# Patient Record
Sex: FEMALE | Race: WHITE | Employment: OTHER | URBAN - METROPOLITAN AREA
[De-identification: names, ages, dates, MRNs, and addresses within clinical notes are randomized per-mention and may not be internally consistent; named-entity substitution may affect disease eponyms.]

---

## 2017-01-10 ENCOUNTER — TELEPHONE (OUTPATIENT)
Dept: INTERNAL MEDICINE CLINIC | Facility: CLINIC | Age: 62
End: 2017-01-10

## 2017-01-10 NOTE — TELEPHONE ENCOUNTER
Pt states symptoms have not resolved. Pt was seen at the clinic in [facility] for bronchitis and then again in the office on 12/16/2016. Pt was given a cough drop for symptoms both times.  The pt did call the office back on 12/19/2016 and was given Augmenti

## 2017-01-10 NOTE — TELEPHONE ENCOUNTER
Pt seen Dr. Claire Schaffer 12/16/16 for bronchitis, still not feeling well, finished almost the whole bottle of cough meds and has laryngitis now, would like to know what Amber Perea thinks she should do, call back

## 2017-01-11 ENCOUNTER — HOSPITAL ENCOUNTER (OUTPATIENT)
Dept: GENERAL RADIOLOGY | Age: 62
Discharge: HOME | End: 2017-01-11
Attending: PHYSICIAN ASSISTANT
Payer: COMMERCIAL

## 2017-01-11 ENCOUNTER — OFFICE VISIT (OUTPATIENT)
Dept: INTERNAL MEDICINE CLINIC | Facility: CLINIC | Age: 62
End: 2017-01-11

## 2017-01-11 VITALS
DIASTOLIC BLOOD PRESSURE: 80 MMHG | OXYGEN SATURATION: 97 % | HEIGHT: 68 IN | TEMPERATURE: 98 F | SYSTOLIC BLOOD PRESSURE: 114 MMHG | WEIGHT: 183 LBS | BODY MASS INDEX: 27.74 KG/M2 | HEART RATE: 76 BPM | RESPIRATION RATE: 16 BRPM

## 2017-01-11 DIAGNOSIS — J20.9 ACUTE BRONCHITIS, UNSPECIFIED ORGANISM: ICD-10-CM

## 2017-01-11 DIAGNOSIS — J20.9 ACUTE BRONCHITIS, UNSPECIFIED ORGANISM: Primary | ICD-10-CM

## 2017-01-11 PROCEDURE — 71020 XR CHEST PA + LAT CHEST (CPT=71020): CPT

## 2017-01-11 PROCEDURE — 99213 OFFICE O/P EST LOW 20 MIN: CPT | Performed by: PHYSICIAN ASSISTANT

## 2017-01-11 RX ORDER — HYDROCODONE POLISTIREX AND CHLORPHENIRAMINE POLISTIREX 10; 8 MG/5ML; MG/5ML
5 SUSPENSION, EXTENDED RELEASE ORAL 2 TIMES DAILY PRN
Qty: 200 ML | Refills: 0 | Status: SHIPPED | OUTPATIENT
Start: 2017-01-11 | End: 2017-01-31

## 2017-01-11 RX ORDER — PREDNISONE 20 MG/1
20 TABLET ORAL DAILY
Qty: 7 TABLET | Refills: 0 | Status: SHIPPED | OUTPATIENT
Start: 2017-01-11 | End: 2017-01-18

## 2017-01-12 ENCOUNTER — TELEPHONE (OUTPATIENT)
Dept: INTERNAL MEDICINE CLINIC | Facility: CLINIC | Age: 62
End: 2017-01-12

## 2017-01-12 DIAGNOSIS — J40 BRONCHITIS: Primary | ICD-10-CM

## 2017-01-12 RX ORDER — DOXYCYCLINE HYCLATE 100 MG
100 TABLET ORAL 2 TIMES DAILY
Qty: 20 TABLET | Refills: 0 | Status: SHIPPED | OUTPATIENT
Start: 2017-01-12 | End: 2017-04-04 | Stop reason: ALTCHOICE

## 2017-01-12 NOTE — TELEPHONE ENCOUNTER
----- Message from Everton Aguirre PA-C sent at 1/12/2017  8:22 AM CST -----  Please inform pt: possible infiltrate in the upper right lobe.  Treat with doxycycline 100mg bid x 10d, recheck xr 4 wks

## 2017-01-12 NOTE — TELEPHONE ENCOUNTER
Spoke with patient and relayed message. She verbalized understanding and agreed with plan. Doxy and CXR ordered. Patient instructed to call the office if symptoms worsen.

## 2017-01-16 NOTE — PROGRESS NOTES
HPI:   Nikole You is a 57year old female who presents for upper respiratory symptoms for  5  weeks. Patient reports cough, lingering, worse at night, with chest tightness. Patient denies nasal congestion, productive cough, fever, wheezing.   [place] myoma 2/2005     prominent myomas noted in the uterus   • Diverticulosis 6/2006     mild   • Internal hemorrhoids 6/2006     2+          Past Surgical History    TORY BIOPSY STEREOTACTIC NODULE 1 SITE RIGHT  1995    Comment benign    HYSTERECTOMY  2004    O no suspicious lesions  EYES: PERRLA, EOMI, normal optic disk, conjunctiva are clear  HEENT: atraumatic, normocephalic, TM's pearly gray with light reflex, oropharynx without erythema, exudates or tonsillar hypertrophy  NECK: supple, no adenopathy, FROM  TABBY

## 2017-02-10 ENCOUNTER — HOSPITAL ENCOUNTER (OUTPATIENT)
Dept: GENERAL RADIOLOGY | Age: 62
Discharge: HOME | End: 2017-02-10
Attending: PHYSICIAN ASSISTANT
Payer: COMMERCIAL

## 2017-02-10 DIAGNOSIS — J40 BRONCHITIS: ICD-10-CM

## 2017-02-10 PROCEDURE — 71020 XR CHEST PA + LAT CHEST (CPT=71020): CPT

## 2017-02-17 ENCOUNTER — MED REC SCAN ONLY (OUTPATIENT)
Dept: INTERNAL MEDICINE CLINIC | Facility: CLINIC | Age: 62
End: 2017-02-17

## 2017-02-23 ENCOUNTER — TELEPHONE (OUTPATIENT)
Dept: INTERNAL MEDICINE CLINIC | Facility: CLINIC | Age: 62
End: 2017-02-23

## 2017-02-23 NOTE — TELEPHONE ENCOUNTER
Pt states is still having the cough present since December. The pt has been trying to keep well hydrated during the day along with the cough drops. At night the cough is at its worse and takes the cough syrup at night.      Pt did report the chest x-ray was

## 2017-02-23 NOTE — TELEPHONE ENCOUNTER
Patient would like to get a script refilled for a cough she has at night  Hydrocod Polst CPM  10-8 mg/5ml  [address]  [place]

## 2017-02-24 ENCOUNTER — OFFICE VISIT (OUTPATIENT)
Dept: INTERNAL MEDICINE CLINIC | Facility: CLINIC | Age: 62
End: 2017-02-24

## 2017-02-24 VITALS
SYSTOLIC BLOOD PRESSURE: 120 MMHG | HEART RATE: 72 BPM | RESPIRATION RATE: 16 BRPM | OXYGEN SATURATION: 96 % | TEMPERATURE: 97 F | HEIGHT: 68 IN | WEIGHT: 183 LBS | DIASTOLIC BLOOD PRESSURE: 70 MMHG | BODY MASS INDEX: 27.74 KG/M2

## 2017-02-24 DIAGNOSIS — R05.3 CHRONIC COUGH: Primary | ICD-10-CM

## 2017-02-24 PROCEDURE — 94010 BREATHING CAPACITY TEST: CPT | Performed by: STUDENT IN AN ORGANIZED HEALTH CARE EDUCATION/TRAINING PROGRAM

## 2017-02-24 PROCEDURE — 99214 OFFICE O/P EST MOD 30 MIN: CPT | Performed by: STUDENT IN AN ORGANIZED HEALTH CARE EDUCATION/TRAINING PROGRAM

## 2017-02-24 RX ORDER — CETIRIZINE HYDROCHLORIDE 10 MG/1
10 TABLET ORAL DAILY
Qty: 30 TABLET | Refills: 3 | Status: SHIPPED | OUTPATIENT
Start: 2017-02-24 | End: 2017-11-29

## 2017-02-24 RX ORDER — HYDROCODONE POLISTIREX AND CHLORPHENIRAMINE POLISTIREX 10; 8 MG/5ML; MG/5ML
5 SUSPENSION, EXTENDED RELEASE ORAL 2 TIMES DAILY PRN
Qty: 200 ML | Refills: 0 | Status: SHIPPED | OUTPATIENT
Start: 2017-02-24 | End: 2018-01-11

## 2017-02-24 RX ORDER — FLUTICASONE PROPIONATE 50 MCG
1 SPRAY, SUSPENSION (ML) NASAL DAILY
Qty: 1 BOTTLE | Refills: 0 | Status: SHIPPED | OUTPATIENT
Start: 2017-02-24 | End: 2017-03-03

## 2017-02-24 NOTE — PROGRESS NOTES
[facility]    REASON FOR VISIT:    Current Complaints: Patient presents with:  Cough: FU, need for spirometry. HPI:  Vinny Matt is a 57year old female who presents for a follow-up visit for persistent cough.   Patient has had a cou MG/5ML Oral Liquid CR 2 tsp every 12 hours PRN Disp:  Rfl: 0   Multiple Vitamin (MULTI-VITAMIN DAILY) Oral Tab Take  by mouth. Disp:  Rfl:    Calcium Carbonate-Vitamin D 600-200 MG-UNIT Oral Tab Take  by mouth 2 (two) times daily.  Disp:  Rfl:    carbamazep all four quadrants. There is no tenderness. Musculoskeletal: Normal range of motion. No edema. Neurological: Alert and oriented to person, place, and time. Normal reflexes. Skin: Skin is warm. No rash noted. No erythema.    Psychiatric: Normal mood an

## 2017-02-27 NOTE — PATIENT INSTRUCTIONS
Cough, Chronic, Uncertain Cause (Adult)    Everyone has had a cough as part of the common cold, flu, or bronchitis. This kind of cough occurs along with an achy feeling, low-grade fever, nasal and sinus congestion, and a scratchy or sore throat.  This usu Let your healthcare provider know if you are taking any of these. Asthma  Cough may be the only sign of mild asthma. You may have tests to find out if asthma is causing your cough. You may also take asthma medicine on a trial basis.   Acid reflux (heartbur © 8742-8765 [address], [address] [place]. All rights reserved. This information is not intended as a substitute for professional medical care. Always follow your healthcare professional's instructions.

## 2017-03-03 ENCOUNTER — TELEPHONE (OUTPATIENT)
Dept: INTERNAL MEDICINE CLINIC | Facility: CLINIC | Age: 62
End: 2017-03-03

## 2017-03-03 DIAGNOSIS — R05.3 CHRONIC COUGH: Primary | ICD-10-CM

## 2017-03-03 NOTE — TELEPHONE ENCOUNTER
Patient called and said she was recommended Dr. Moshe Grant- pulmonologist, but he is not accepting new patients. Patient requesting another recommendation. Please advise. Patient mentioned Dr. Claire Schaffer had recommended Dr. Jose D Kenney.

## 2017-03-03 NOTE — TELEPHONE ENCOUNTER
Per Dr. Aline Pena refer to Dr. Pedro Jo or Dr. Radha Skinner. [address] [facility],[address]   Phone: [phone]  Pt given above referral information and expressed understanding. Referral placed.

## 2017-04-07 ENCOUNTER — OFFICE VISIT (OUTPATIENT)
Dept: INTERNAL MEDICINE CLINIC | Facility: CLINIC | Age: 62
End: 2017-04-07

## 2017-04-07 VITALS
TEMPERATURE: 98 F | BODY MASS INDEX: 27.58 KG/M2 | HEIGHT: 68 IN | SYSTOLIC BLOOD PRESSURE: 122 MMHG | RESPIRATION RATE: 16 BRPM | DIASTOLIC BLOOD PRESSURE: 72 MMHG | HEART RATE: 82 BPM | OXYGEN SATURATION: 98 % | WEIGHT: 182 LBS

## 2017-04-07 DIAGNOSIS — R05.3 CHRONIC COUGH: Primary | ICD-10-CM

## 2017-04-07 PROCEDURE — 99213 OFFICE O/P EST LOW 20 MIN: CPT | Performed by: STUDENT IN AN ORGANIZED HEALTH CARE EDUCATION/TRAINING PROGRAM

## 2017-04-08 NOTE — PROGRESS NOTES
[facility]    REASON FOR VISIT:    Current Complaints: Patient presents with:  Cough: 1 month Follow up      HPI:  Jayde Arnett is a 57year old female who presents for a f/u visit for chronic cough.   Patient had an episode of bronchitis mouth before breakfast. Disp: 90 tablet Rfl: 3   HYDROcodone Bitartrate Does not apply Crystals by Does not apply route. Disp:  Rfl:    Calcium Carbonate-Vitamin D 600-200 MG-UNIT Oral Tab Take  by mouth 2 (two) times daily.  Disp:  Rfl:    carbamazepine (T all four quadrants. There is no tenderness. Musculoskeletal: Normal range of motion. No edema. Neurological: Alert and oriented to person, place, and time. Normal reflexes. Skin: Skin is warm. No rash noted. No erythema.    Psychiatric: Normal mood an

## 2017-05-02 ENCOUNTER — OFFICE VISIT (OUTPATIENT)
Dept: INTERNAL MEDICINE CLINIC | Facility: CLINIC | Age: 62
End: 2017-05-02

## 2017-05-02 VITALS
HEART RATE: 103 BPM | RESPIRATION RATE: 16 BRPM | SYSTOLIC BLOOD PRESSURE: 130 MMHG | OXYGEN SATURATION: 97 % | TEMPERATURE: 98 F | HEIGHT: 68 IN | DIASTOLIC BLOOD PRESSURE: 70 MMHG

## 2017-05-02 DIAGNOSIS — J18.9 ATYPICAL PNEUMONIA: ICD-10-CM

## 2017-05-02 DIAGNOSIS — R05.3 CHRONIC COUGH: Primary | ICD-10-CM

## 2017-05-02 PROCEDURE — 99214 OFFICE O/P EST MOD 30 MIN: CPT | Performed by: INTERNAL MEDICINE

## 2017-05-02 RX ORDER — AMOXICILLIN AND CLAVULANATE POTASSIUM 875; 125 MG/1; MG/1
1 TABLET, FILM COATED ORAL 2 TIMES DAILY
Qty: 20 TABLET | Refills: 0 | Status: SHIPPED | OUTPATIENT
Start: 2017-05-02 | End: 2017-05-12

## 2017-05-02 RX ORDER — PREDNISONE 10 MG/1
TABLET ORAL
Qty: 30 TABLET | Refills: 0 | Status: SHIPPED | OUTPATIENT
Start: 2017-05-02 | End: 2017-05-16 | Stop reason: ALTCHOICE

## 2017-05-02 NOTE — PATIENT INSTRUCTIONS
Cough, Chronic, Uncertain Cause (Adult)    Everyone has had a cough as part of the common cold, flu, or bronchitis. This kind of cough occurs along with an achy feeling, low-grade fever, nasal and sinus congestion, and a scratchy or sore throat.  This usu Let your healthcare provider know if you are taking any of these. Asthma  Cough may be the only sign of mild asthma. You may have tests to find out if asthma is causing your cough. You may also take asthma medicine on a trial basis.   Acid reflux (heartbur © 4886-1910 [address], [address] [place]. All rights reserved. This information is not intended as a substitute for professional medical care. Always follow your healthcare professional's instructions.

## 2017-05-02 NOTE — PROGRESS NOTES
HPI:    Patient ID: Janes Patel is a 57year old female. Cough  This is a chronic problem. Episode onset: 7 m. The problem has been gradually worsening. The problem occurs every few minutes. The cough is productive of purulent sputum.  Pertinent Carbonate-Vitamin D 600-200 MG-UNIT Oral Tab Take  by mouth 2 (two) times daily. Disp:  Rfl:    carbamazepine (TEGRETOL) 200 MG Oral Tab Take 400 mg by mouth 2 (two) times daily.  Disp:  Rfl: 2   cetirizine 10 MG Oral Tab Take 1 tablet (10 mg total) by mout

## 2017-05-03 ENCOUNTER — TELEPHONE (OUTPATIENT)
Dept: INTERNAL MEDICINE CLINIC | Facility: CLINIC | Age: 62
End: 2017-05-03

## 2017-05-03 NOTE — TELEPHONE ENCOUNTER
Patient said she received a call today saying her CT was approved for insurance. When she called insurance they said they had no record of the approval.  Message was left on her home phone to call our office if she has any questions.

## 2017-05-03 NOTE — TELEPHONE ENCOUNTER
Called patient and left message that the CT scan was approved and to call central scheduling and make an appt.   The referral is only good until June 1,2017

## 2017-05-05 ENCOUNTER — HOSPITAL ENCOUNTER (OUTPATIENT)
Dept: CT IMAGING | Facility: HOSPITAL | Age: 62
Discharge: HOME | End: 2017-05-05
Attending: INTERNAL MEDICINE
Payer: COMMERCIAL

## 2017-05-05 DIAGNOSIS — R05.3 CHRONIC COUGH: ICD-10-CM

## 2017-05-05 DIAGNOSIS — J18.9 ATYPICAL PNEUMONIA: ICD-10-CM

## 2017-05-05 PROCEDURE — 71260 CT THORAX DX C+: CPT | Performed by: INTERNAL MEDICINE

## 2017-05-08 ENCOUNTER — LAB ENCOUNTER (OUTPATIENT)
Dept: LAB | Facility: HOSPITAL | Age: 62
End: 2017-05-08
Attending: Other
Payer: COMMERCIAL

## 2017-05-08 DIAGNOSIS — Z01.818 PRE-OP TESTING: Primary | ICD-10-CM

## 2017-05-08 PROCEDURE — 84520 ASSAY OF UREA NITROGEN: CPT

## 2017-05-08 PROCEDURE — 36415 COLL VENOUS BLD VENIPUNCTURE: CPT

## 2017-05-08 PROCEDURE — 82565 ASSAY OF CREATININE: CPT

## 2017-05-11 ENCOUNTER — HOSPITAL ENCOUNTER (OUTPATIENT)
Dept: MRI IMAGING | Facility: HOSPITAL | Age: 62
Discharge: HOME | End: 2017-05-11
Attending: Other
Payer: COMMERCIAL

## 2017-05-11 DIAGNOSIS — G40.919 BREAKTHROUGH SEIZURE (HCC): ICD-10-CM

## 2017-05-11 PROCEDURE — A9575 INJ GADOTERATE MEGLUMI 0.1ML: HCPCS

## 2017-05-11 PROCEDURE — 70553 MRI BRAIN STEM W/O & W/DYE: CPT | Performed by: OTHER

## 2017-05-16 ENCOUNTER — OFFICE VISIT (OUTPATIENT)
Dept: INTERNAL MEDICINE CLINIC | Facility: CLINIC | Age: 62
End: 2017-05-16

## 2017-05-16 VITALS
HEIGHT: 68 IN | TEMPERATURE: 99 F | WEIGHT: 182 LBS | BODY MASS INDEX: 27.58 KG/M2 | HEART RATE: 75 BPM | SYSTOLIC BLOOD PRESSURE: 118 MMHG | DIASTOLIC BLOOD PRESSURE: 70 MMHG | RESPIRATION RATE: 16 BRPM | OXYGEN SATURATION: 97 %

## 2017-05-16 DIAGNOSIS — Z13.220 LIPID SCREENING: ICD-10-CM

## 2017-05-16 DIAGNOSIS — Z13.0 SCREENING, ANEMIA, DEFICIENCY, IRON: ICD-10-CM

## 2017-05-16 DIAGNOSIS — Z00.00 LABORATORY EXAMINATION ORDERED AS PART OF A ROUTINE GENERAL MEDICAL EXAMINATION: ICD-10-CM

## 2017-05-16 DIAGNOSIS — Z13.29 THYROID DISORDER SCREEN: ICD-10-CM

## 2017-05-16 DIAGNOSIS — Z13.89 SCREENING FOR GENITOURINARY CONDITION: ICD-10-CM

## 2017-05-16 DIAGNOSIS — J98.01 POST-INFECTION BRONCHOSPASM: Primary | ICD-10-CM

## 2017-05-16 DIAGNOSIS — Z01.84 IMMUNITY STATUS TESTING: ICD-10-CM

## 2017-05-16 PROCEDURE — 99213 OFFICE O/P EST LOW 20 MIN: CPT | Performed by: INTERNAL MEDICINE

## 2017-05-16 NOTE — PROGRESS NOTES
HPI:    Patient ID: Jayde Arnett is a 57year old female. HPI  Follow up on cough. Ct reviewed. No infection or mass. Cough has resolved  Review of Systems   All other systems reviewed and are negative.            Current Outpatient Prescriptions REFLEX TO FREE T4  URINALYSIS, ROUTINE  HCV Antibody [E]    Meds This Visit:  No prescriptions requested or ordered in this encounter    Imaging & Referrals:  None       ZU#[id number]

## 2017-08-20 ENCOUNTER — APPOINTMENT (OUTPATIENT)
Dept: GENERAL RADIOLOGY | Facility: HOSPITAL | Age: 62
End: 2017-08-20
Attending: EMERGENCY MEDICINE
Payer: COMMERCIAL

## 2017-08-20 ENCOUNTER — HOSPITAL ENCOUNTER (EMERGENCY)
Facility: HOSPITAL | Age: 62
Discharge: HOME | End: 2017-08-20
Attending: EMERGENCY MEDICINE
Payer: COMMERCIAL

## 2017-08-20 VITALS
DIASTOLIC BLOOD PRESSURE: 87 MMHG | HEART RATE: 71 BPM | HEIGHT: 68 IN | SYSTOLIC BLOOD PRESSURE: 129 MMHG | TEMPERATURE: 98 F | BODY MASS INDEX: 27.28 KG/M2 | RESPIRATION RATE: 14 BRPM | OXYGEN SATURATION: 99 % | WEIGHT: 180 LBS

## 2017-08-20 DIAGNOSIS — S63.501A SPRAIN OF RIGHT WRIST, INITIAL ENCOUNTER: Primary | ICD-10-CM

## 2017-08-20 DIAGNOSIS — S63.502A SPRAIN OF LEFT WRIST, INITIAL ENCOUNTER: ICD-10-CM
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
## 2017-08-20 PROCEDURE — 73110 X-RAY EXAM OF WRIST: CPT | Performed by: EMERGENCY MEDICINE

## 2017-08-20 PROCEDURE — 99284 EMERGENCY DEPT VISIT MOD MDM: CPT

## 2017-08-20 PROCEDURE — 73130 X-RAY EXAM OF HAND: CPT | Performed by: EMERGENCY MEDICINE

## 2017-08-20 NOTE — ED PROVIDER NOTES
Patient Seen in: [facility] Emergency Department    History   Patient presents with:  Fall (musculoskeletal, neurologic)  Upper Extremity Injury (musculoskeletal)    Stated Complaint: fall, hand inj    HPI    Right handed 21-year-old presents after taking differently: Take 10 mg by mouth daily as needed. Ospemifene (OSPHENA) 60 MG Oral Tab,  Take 1 tablet by mouth daily. Calcium Carbonate-Vitamin D 600-200 MG-UNIT Oral Tab,  Take  by mouth 2 (two) times daily.    carbamazepine (TEGRETOL) 200 MG thenar eminences bilaterally left greater than right with some pain over the first metacarpal on the left. No tenderness of the forearms including the radial head bilaterally. Compartments are soft. Sensation intact. 2+ radial pulses bilaterally.   Cassie VIEWS), RIGHT (CPT=73110)  TECHNIQUE:  Three views were obtained. COMPARISON:  [facility] , XR WRIST COMPLETE (MIN 3 VIEWS), LEFT (CPT=73110), 8/20/2017, 12:54.   INDICATIONS:  fall, hand inj  PATIENT STATED HISTORY: (As transcribed by Technologist)  Pain to a

## 2017-08-20 NOTE — ED INITIAL ASSESSMENT (HPI)
Patient fell in parking lot, abrasions to knees, bridge of nose and bilateral wrists. Left wrist more swollen than right. Pain to bilateral wrists/base of thumbs. No n/t, good pulses and cap refill to both upper extremities.

## 2017-08-21 ENCOUNTER — TELEPHONE (OUTPATIENT)
Dept: INTERNAL MEDICINE CLINIC | Facility: CLINIC | Age: 62
End: 2017-08-21

## 2017-08-21 NOTE — TELEPHONE ENCOUNTER
I called patient and LM on  to call back and schedule an ER visit for fall-hand injury within 3 days. She was released from [facility] 08/20/2017.

## 2017-09-13 ENCOUNTER — OFFICE VISIT (OUTPATIENT)
Dept: INTERNAL MEDICINE CLINIC | Facility: CLINIC | Age: 62
End: 2017-09-13

## 2017-09-13 VITALS
OXYGEN SATURATION: 98 % | SYSTOLIC BLOOD PRESSURE: 130 MMHG | HEART RATE: 71 BPM | HEIGHT: 68 IN | WEIGHT: 189 LBS | DIASTOLIC BLOOD PRESSURE: 68 MMHG | BODY MASS INDEX: 28.64 KG/M2 | TEMPERATURE: 98 F | RESPIRATION RATE: 16 BRPM

## 2017-09-13 DIAGNOSIS — R42 DIZZINESS: Primary | ICD-10-CM

## 2017-09-13 DIAGNOSIS — J30.2 ACUTE SEASONAL ALLERGIC RHINITIS, UNSPECIFIED TRIGGER: ICD-10-CM

## 2017-09-13 PROCEDURE — 99213 OFFICE O/P EST LOW 20 MIN: CPT | Performed by: PHYSICIAN ASSISTANT

## 2017-09-13 NOTE — PATIENT INSTRUCTIONS
Start flonase nasal spray, 1 spray to each nostril daily. Also restart zyrtec once a day at bedtime.

## 2017-09-13 NOTE — PROGRESS NOTES
HPI:    Patient ID: Shivani Wooten is a 57year old female. Patient presents with:  Dizziness: and nausea that started this morning     Dizziness   This is a recurrent problem. The current episode started today. The problem occurs intermittently.  Halina Lang Multiple Vitamins-Minerals (WOMENS MULTIVITAMIN PLUS) Oral Tab Take by mouth. Disp:  Rfl:    cetirizine 10 MG Oral Tab Take 1 tablet (10 mg total) by mouth daily.  (Patient taking differently: Take 10 mg by mouth daily as needed.  ) Disp: 30 tablet Rfl: 3 She is alert and oriented to person, place, and time. She has normal strength and normal reflexes. No cranial nerve deficit or sensory deficit. Coordination and gait normal.   Skin: Skin is warm and dry. No rash noted. No erythema. No pallor.    Psychiatric

## 2017-09-25 ENCOUNTER — HOSPITAL ENCOUNTER (OUTPATIENT)
Dept: BONE DENSITY | Age: 62
Discharge: HOME | End: 2017-09-25
Attending: OBSTETRICS & GYNECOLOGY
Payer: COMMERCIAL

## 2017-09-25 ENCOUNTER — TELEPHONE (OUTPATIENT)
Dept: INTERNAL MEDICINE CLINIC | Facility: CLINIC | Age: 62
End: 2017-09-25

## 2017-09-25 DIAGNOSIS — Z13.220 SCREENING FOR LIPID DISORDERS: ICD-10-CM

## 2017-09-25 DIAGNOSIS — Z13.0 SCREENING, ANEMIA, DEFICIENCY, IRON: ICD-10-CM

## 2017-09-25 DIAGNOSIS — Z01.84 IMMUNITY STATUS TESTING: ICD-10-CM

## 2017-09-25 DIAGNOSIS — Z13.89 SCREENING FOR BLOOD OR PROTEIN IN URINE: ICD-10-CM

## 2017-09-25 DIAGNOSIS — Z01.419 ENCOUNTER FOR GYNECOLOGICAL EXAMINATION WITHOUT ABNORMAL FINDING: ICD-10-CM

## 2017-09-25 DIAGNOSIS — Z13.1 SCREENING FOR DIABETES MELLITUS (DM): ICD-10-CM

## 2017-09-25 DIAGNOSIS — N94.10 DYSPAREUNIA IN FEMALE: ICD-10-CM

## 2017-09-25 DIAGNOSIS — Z13.228 SCREENING FOR METABOLIC DISORDER: ICD-10-CM

## 2017-09-25 DIAGNOSIS — N94.10 DYSPAREUNIA, FEMALE: ICD-10-CM

## 2017-09-25 DIAGNOSIS — Z78.0 MENOPAUSE: ICD-10-CM

## 2017-09-25 PROCEDURE — 77080 DXA BONE DENSITY AXIAL: CPT | Performed by: OBSTETRICS & GYNECOLOGY

## 2017-11-27 ENCOUNTER — HOSPITAL ENCOUNTER (OUTPATIENT)
Dept: MAMMOGRAPHY | Age: 62
Discharge: HOME | End: 2017-11-27
Attending: OBSTETRICS & GYNECOLOGY
Payer: COMMERCIAL

## 2017-11-27 DIAGNOSIS — Z12.31 ENCOUNTER FOR SCREENING MAMMOGRAM FOR BREAST CANCER: ICD-10-CM

## 2017-11-27 PROCEDURE — 77067 SCR MAMMO BI INCL CAD: CPT | Performed by: OBSTETRICS & GYNECOLOGY

## 2017-11-27 PROCEDURE — 77063 BREAST TOMOSYNTHESIS BI: CPT | Performed by: OBSTETRICS & GYNECOLOGY

## 2017-11-30 ENCOUNTER — HOSPITAL ENCOUNTER (OUTPATIENT)
Dept: MAMMOGRAPHY | Facility: HOSPITAL | Age: 62
Discharge: HOME | End: 2017-11-30
Attending: OBSTETRICS & GYNECOLOGY
Payer: COMMERCIAL

## 2017-11-30 DIAGNOSIS — R92.2 INCONCLUSIVE MAMMOGRAM: ICD-10-CM

## 2017-11-30 PROCEDURE — 77061 BREAST TOMOSYNTHESIS UNI: CPT | Performed by: OBSTETRICS & GYNECOLOGY

## 2017-11-30 PROCEDURE — 77065 DX MAMMO INCL CAD UNI: CPT | Performed by: OBSTETRICS & GYNECOLOGY

## 2017-11-30 PROCEDURE — 76642 ULTRASOUND BREAST LIMITED: CPT | Performed by: OBSTETRICS & GYNECOLOGY

## 2017-12-29 ENCOUNTER — OFFICE VISIT (OUTPATIENT)
Dept: FAMILY MEDICINE CLINIC | Facility: CLINIC | Age: 62
End: 2017-12-29

## 2017-12-29 VITALS
HEART RATE: 77 BPM | DIASTOLIC BLOOD PRESSURE: 52 MMHG | TEMPERATURE: 99 F | OXYGEN SATURATION: 98 % | RESPIRATION RATE: 20 BRPM | WEIGHT: 180 LBS | SYSTOLIC BLOOD PRESSURE: 122 MMHG | BODY MASS INDEX: 27.28 KG/M2 | HEIGHT: 68 IN

## 2017-12-29 DIAGNOSIS — R05.9 COUGH: Primary | ICD-10-CM

## 2017-12-29 PROCEDURE — 99213 OFFICE O/P EST LOW 20 MIN: CPT | Performed by: NURSE PRACTITIONER

## 2017-12-29 NOTE — PROGRESS NOTES
CHIEF COMPLAINT:   Patient presents with:  Cough: cough [id number]      HPI:   Vinny Matt is a 60year old female who presents for upper respiratory symptoms for  10 days. Patient reports cough, productive moderate amt clear phlegm.   Pt states she h 6/21/2005: ELECTROCARDIOGRAM, COMPLETE  2004: HYSTERECTOMY  1995: TORY BIOPSY STEREOTACTIC NODULE 1 SITE RIGHT      Comment: benign  2004: OOPHORECTOMY  2010: OTHER SURGICAL HISTORY      Comment: achilles heel  1/30/2006: OTHER SURGICAL HISTORY      Comment Pt had similar cough at this time last year, multiple rounds of abx and oral steroid did not help per pt report  I reviewed notes in EMR related to pt's previous cough  Pt defers abx or oral steroid today  I advised nasal spray, asmanex inhaler 1 puff helio · You may use over-the-counter medicine to control fever or pain, unless another pain medicine was prescribed. If you have chronic liver or kidney disease or have ever had a stomach ulcer or gastrointestinal bleeding, talk with your healthcare provider bef © 4901-9946 [address]. [address] [facility], [facility] [place]. All rights reserved. This information is not intended as a substitute for professional medical care. Always follow your healthcare professional's instructions.             The

## 2017-12-29 NOTE — PATIENT INSTRUCTIONS
Viral Bronchitis (Adult)    You have a viral bronchitis. Bronchitis is inflammation and swelling of the lining of the lungs. This is often caused by an infection. Symptoms include a dry, hacking cough that is worse at night.  The cough may bring up yellow · Over-the-counter cough, cold, and sore-throat medicines will not shorten the length of the illness, but they may help to reduce symptoms. Don't use decongestants if you have high blood pressure.   Follow-up care  Follow up with your healthcare provider, o

## 2018-01-05 ENCOUNTER — TELEPHONE (OUTPATIENT)
Dept: INTERNAL MEDICINE CLINIC | Facility: CLINIC | Age: 63
End: 2018-01-05

## 2018-01-05 NOTE — TELEPHONE ENCOUNTER
Patient was seen at [facility] and treated for viral bronchitis. She has been taking Asmanex and Flonase as she deferred antibiotics and steroids. Patient states she feels a lot better and cough has started to subside.  States she has been taking Delsym at night

## 2018-01-11 ENCOUNTER — OFFICE VISIT (OUTPATIENT)
Dept: INTERNAL MEDICINE CLINIC | Facility: CLINIC | Age: 63
End: 2018-01-11

## 2018-01-11 VITALS
OXYGEN SATURATION: 98 % | SYSTOLIC BLOOD PRESSURE: 118 MMHG | HEIGHT: 68 IN | WEIGHT: 180 LBS | HEART RATE: 78 BPM | BODY MASS INDEX: 27.28 KG/M2 | RESPIRATION RATE: 16 BRPM | TEMPERATURE: 98 F | DIASTOLIC BLOOD PRESSURE: 72 MMHG

## 2018-01-11 DIAGNOSIS — J20.9 ACUTE BRONCHITIS, UNSPECIFIED ORGANISM: Primary | ICD-10-CM

## 2018-01-11 PROCEDURE — 99213 OFFICE O/P EST LOW 20 MIN: CPT | Performed by: STUDENT IN AN ORGANIZED HEALTH CARE EDUCATION/TRAINING PROGRAM

## 2018-01-11 RX ORDER — FLUTICASONE PROPIONATE 50 MCG
1 SPRAY, SUSPENSION (ML) NASAL DAILY
Qty: 1 BOTTLE | Refills: 0 | Status: SHIPPED | OUTPATIENT
Start: 2018-01-11 | End: 2018-01-18

## 2018-01-11 RX ORDER — HYDROCODONE POLISTIREX AND CHLORPHENIRAMINE POLISTIREX 10; 8 MG/5ML; MG/5ML
5 SUSPENSION, EXTENDED RELEASE ORAL 2 TIMES DAILY PRN
Qty: 200 ML | Refills: 0 | Status: SHIPPED | OUTPATIENT
Start: 2018-01-11 | End: 2018-02-01

## 2018-01-11 RX ORDER — INFLUENZA VIRUS VACCINE 15; 15; 15; 15 UG/.5ML; UG/.5ML; UG/.5ML; UG/.5ML
SUSPENSION INTRAMUSCULAR
COMMUNITY
Start: 2017-10-17 | End: 2018-04-26 | Stop reason: ALTCHOICE

## 2018-01-11 RX ORDER — AZITHROMYCIN 250 MG/1
TABLET, FILM COATED ORAL
Qty: 6 TABLET | Refills: 0 | Status: SHIPPED | OUTPATIENT
Start: 2018-01-11 | End: 2018-02-01 | Stop reason: ALTCHOICE

## 2018-01-11 NOTE — PROGRESS NOTES
HPI:    Patient ID: Binu Spears is a 60year old female. Here today for evaluation of persistent cough. Patient was seen in walk-in clinic on 12/29 with upper respiratory symptoms predominantly coughing for 10 days.   At that time cough was produ pain and palpitations. Gastrointestinal: Negative. Negative for abdominal pain. Endocrine: Negative. Genitourinary: Negative. Musculoskeletal: Negative. Negative for arthralgias and myalgias. Skin: Negative. Neurological: Negative.     Psyc Pulmonary/Chest: Effort normal. No respiratory distress. She has no wheezes. She has rhonchi in the right middle field, the right lower field, the left middle field and the left lower field. She has no rales. Abdominal: Soft.  Bowel sounds are normal. 1 spray by Each Nare route daily.            Imaging & Referrals:  None       Seble Mejia MD

## 2018-02-01 ENCOUNTER — OFFICE VISIT (OUTPATIENT)
Dept: INTERNAL MEDICINE CLINIC | Facility: CLINIC | Age: 63
End: 2018-02-01

## 2018-02-01 VITALS
WEIGHT: 180 LBS | RESPIRATION RATE: 16 BRPM | OXYGEN SATURATION: 99 % | BODY MASS INDEX: 27 KG/M2 | DIASTOLIC BLOOD PRESSURE: 76 MMHG | SYSTOLIC BLOOD PRESSURE: 122 MMHG | TEMPERATURE: 99 F | HEART RATE: 103 BPM

## 2018-02-01 DIAGNOSIS — R05.9 COUGH: ICD-10-CM

## 2018-02-01 DIAGNOSIS — B34.9 ACUTE VIRAL SYNDROME: Primary | ICD-10-CM

## 2018-02-01 PROCEDURE — 87502 INFLUENZA DNA AMP PROBE: CPT | Performed by: STUDENT IN AN ORGANIZED HEALTH CARE EDUCATION/TRAINING PROGRAM

## 2018-02-01 PROCEDURE — 87798 DETECT AGENT NOS DNA AMP: CPT | Performed by: STUDENT IN AN ORGANIZED HEALTH CARE EDUCATION/TRAINING PROGRAM

## 2018-02-01 PROCEDURE — 99213 OFFICE O/P EST LOW 20 MIN: CPT | Performed by: STUDENT IN AN ORGANIZED HEALTH CARE EDUCATION/TRAINING PROGRAM

## 2018-02-01 RX ORDER — OSELTAMIVIR PHOSPHATE 75 MG/1
75 CAPSULE ORAL 2 TIMES DAILY
Qty: 10 CAPSULE | Refills: 0 | Status: SHIPPED | OUTPATIENT
Start: 2018-02-01 | End: 2018-02-06

## 2018-02-01 RX ORDER — HYDROCODONE POLISTIREX AND CHLORPHENIRAMINE POLISTIREX 10; 8 MG/5ML; MG/5ML
5 SUSPENSION, EXTENDED RELEASE ORAL 2 TIMES DAILY PRN
Qty: 200 ML | Refills: 0 | Status: SHIPPED | OUTPATIENT
Start: 2018-02-01 | End: 2018-03-03

## 2018-02-01 NOTE — PROGRESS NOTES
HPI:    Patient ID: Abiodun James is a 60year old female. Cough   This is a new problem. The current episode started yesterday. The problem has been gradually worsening. The problem occurs hourly. The cough is non-productive.  Associated symptoms Prefilled Syringe  Disp:  Rfl:    Ospemifene (OSPHENA) 60 MG Oral Tab Take 1 tablet by mouth daily.  Disp: 90 tablet Rfl: 3   LEVOXYL 150 MCG Oral Tab Take 1 tablet (150 mcg total) by mouth before breakfast. Disp: 90 tablet Rfl: 3   Multiple Vitamins-[place] Acute viral syndrome  (primary encounter diagnosis)  Cough    Suspect influenza. Will do the nasal swab antigen test. Start Tamiflu 75 mg bid, if FLU PCR is positive - patient is to continue Tamiflu for five days.   Patient instructed to use Tylenol/Advil

## 2018-02-02 ENCOUNTER — TELEPHONE (OUTPATIENT)
Dept: INTERNAL MEDICINE CLINIC | Facility: CLINIC | Age: 63
End: 2018-02-02

## 2018-02-02 NOTE — TELEPHONE ENCOUNTER
Spoke with Armond Perez from [facility] lab she reports: Positive for Influenza A Nucleic Acid      Per Dr. Tania Diaz finish tamiflu. D/w spouse results and above recommendations. He expressed understanding.

## 2018-04-26 ENCOUNTER — OFFICE VISIT (OUTPATIENT)
Dept: INTERNAL MEDICINE CLINIC | Facility: CLINIC | Age: 63
End: 2018-04-26

## 2018-04-26 VITALS
SYSTOLIC BLOOD PRESSURE: 124 MMHG | TEMPERATURE: 98 F | RESPIRATION RATE: 16 BRPM | BODY MASS INDEX: 28.19 KG/M2 | HEART RATE: 85 BPM | WEIGHT: 186 LBS | DIASTOLIC BLOOD PRESSURE: 80 MMHG | HEIGHT: 68 IN | OXYGEN SATURATION: 98 %

## 2018-04-26 DIAGNOSIS — M65.342 TRIGGER RING FINGER OF LEFT HAND: Primary | ICD-10-CM

## 2018-04-26 DIAGNOSIS — F50.81 BINGE EATING DISORDER: ICD-10-CM

## 2018-04-26 PROCEDURE — 99214 OFFICE O/P EST MOD 30 MIN: CPT | Performed by: STUDENT IN AN ORGANIZED HEALTH CARE EDUCATION/TRAINING PROGRAM

## 2018-04-26 NOTE — PROGRESS NOTES
HPI:    Patient ID: Gabriel Mlilan is a 60year old female. HPI  Patient is here today complaining of left ring finger locking that she noticed about 6 weeks ago.   She wakes up in the morning and his finger is flexed and it takes her effort to ext Allergies:  Erythromycin [Emcin*    Confusion  Tomatoes                   PHYSICAL EXAM:   Physical Exam   Constitutional: She is oriented to person, place, and time. She appears well-developed and well-nourished.    HENT:   Head: Normocephalic and atraum

## 2018-04-27 ENCOUNTER — TELEPHONE (OUTPATIENT)
Dept: INTERNAL MEDICINE CLINIC | Facility: CLINIC | Age: 63
End: 2018-04-27

## 2018-05-25 ENCOUNTER — TELEPHONE (OUTPATIENT)
Dept: INTERNAL MEDICINE CLINIC | Facility: CLINIC | Age: 63
End: 2018-05-25

## 2018-05-25 NOTE — TELEPHONE ENCOUNTER
Received fax from [facility] on file - prior auth needed for vyvanse 60mg. Form in triage.

## 2018-05-25 NOTE — PROGRESS NOTES
[facility]    REASON FOR VISIT:    Current Complaints: Patient presents with:  Weight Loss: 1 m f/u      HPI:  Lisbeth Weaver is a 60year old female who presents for 1 month f/u for binge eating disorder.     Patient has been taking Vyvanse HENT: Negative for hearing loss, congestion, sore throat and neck pain. Eyes: Negative for pain and visual disturbance. Respiratory: Negative for cough and shortness of breath. Cardiovascular: Negative for chest pain and palpitations.    The [employer] disorder   Started on Vyvanse 50 mg 4 weeks ago - reports great improvement in her symptoms. Will increase to 60 mg daily and continue at this dose long term. F/u in 3 months. -     Lisdexamfetamine Dimesylate (VYVANSE) 60 MG Oral Cap;  Take 1 capsule

## 2018-06-22 ENCOUNTER — TELEPHONE (OUTPATIENT)
Dept: INTERNAL MEDICINE CLINIC | Facility: CLINIC | Age: 63
End: 2018-06-22

## 2018-06-22 NOTE — TELEPHONE ENCOUNTER
Approval for early refill of 2 days was approved. The pt will be leaving out of town. The pharmacist verbalized understanding.

## 2018-06-22 NOTE — TELEPHONE ENCOUNTER
[facility] calling to verify if it is ok to fill vyvanse script today - script was written with a fill date of 6/24 but the pt says she is going out of town today. Please c/b at #[phone].

## 2018-08-24 PROBLEM — F50.81 BINGE EATING DISORDER: Status: ACTIVE | Noted: 2018-08-24

## 2018-08-24 PROBLEM — F50.819 BINGE EATING DISORDER: Status: ACTIVE | Noted: 2018-08-24

## 2018-08-24 NOTE — PROGRESS NOTES
[facility]    REASON FOR VISIT:    Current Complaints: Patient presents with:  Eating Disorder      HPI:  Pj Morales is a 60year old female who presents for 1 month f/u for binge eating disorder.     Patient has been taking Vyvanse 60 and shortness of breath. Cardiovascular: Negative for chest pain and palpitations. Gastrointestinal: Negative for nausea, vomiting, abdominal pain and diarrhea. Genitourinary: Negative for urgency, frequency and difficulty urinating.    Musculoskelet Well controlled with current therapy. Continue Vyvanse at 60 mg daily. F/u in 3 months. -     Lisdexamfetamine Dimesylate (VYVANSE) 60 MG Oral Cap;  Take 1 capsule (60 mg total) by mouth daily.  -     Lisdexamfetamine Dimesylate (VYVANSE) 60 MG Oral Cap

## 2018-11-08 ENCOUNTER — TELEPHONE (OUTPATIENT)
Dept: INTERNAL MEDICINE CLINIC | Facility: CLINIC | Age: 63
End: 2018-11-08

## 2018-11-08 ENCOUNTER — MED REC SCAN ONLY (OUTPATIENT)
Dept: INTERNAL MEDICINE CLINIC | Facility: CLINIC | Age: 63
End: 2018-11-08

## 2018-11-08 NOTE — TELEPHONE ENCOUNTER
Pt returned call to r/s appt with Dr Jeison Rees 11-16-18. No appts avail prior to her vivance running out on 11-24. Further, pt is only following Dr Jeison Rees for this issue, will return to Dr Joaquín Pro when this issue is resolved. Please advise.  Thank you

## 2018-11-09 NOTE — TELEPHONE ENCOUNTER
Noted below. Already spoke to patient and informed her of her appointment next week with Dr. Delphine Estrella on 11/19/18.

## 2018-11-09 NOTE — TELEPHONE ENCOUNTER
FYI - Patient called and wanted to let us know that she plans to finish this medication with Dr. Dennise Starkey and then continue to follow with Dr. Car Kinsey

## 2018-11-09 NOTE — TELEPHONE ENCOUNTER
Patient thought that her appointment on 11/16/18 needed to be rescheduled, and that is why she is asking for a script for her Vyvanse, but it was not cancelled. Dr. Dennise Starkey will be here.  Informed the patient of this, and she will be here for that appointm

## 2018-11-19 ENCOUNTER — OFFICE VISIT (OUTPATIENT)
Dept: INTERNAL MEDICINE CLINIC | Facility: CLINIC | Age: 63
End: 2018-11-19
Payer: COMMERCIAL

## 2018-11-19 VITALS
HEIGHT: 68 IN | SYSTOLIC BLOOD PRESSURE: 120 MMHG | WEIGHT: 172.81 LBS | DIASTOLIC BLOOD PRESSURE: 82 MMHG | HEART RATE: 67 BPM | OXYGEN SATURATION: 98 % | BODY MASS INDEX: 26.19 KG/M2 | TEMPERATURE: 98 F | RESPIRATION RATE: 14 BRPM

## 2018-11-19 DIAGNOSIS — F50.81 BINGE EATING DISORDER: ICD-10-CM

## 2018-11-19 PROCEDURE — 99213 OFFICE O/P EST LOW 20 MIN: CPT | Performed by: STUDENT IN AN ORGANIZED HEALTH CARE EDUCATION/TRAINING PROGRAM

## 2018-11-19 NOTE — PROGRESS NOTES
[facility]    REASON FOR VISIT:    Current Complaints: Patient presents with:   Follow - Up: BED, her thyroid medication got changed so she is curious if thats why she isnt losing the weight      HPI:  Myrna Gonzales is a 58year old female Reflexes are normal.   HENT: Negative for hearing loss, congestion, sore throat and neck pain. Eyes: Negative for pain and visual disturbance. Respiratory: Negative for cough and shortness of breath.     Cardiovascular: Negative for chest pain and palp thyroid medication got changed so she is curious if thats why she isnt losing the weight         Quan Lakeshamaurizio was seen today for follow - up. Diagnoses and all orders for this visit:    Binge eating disorder  Controlled on current therapy.  Will get her new

## 2018-11-28 ENCOUNTER — HOSPITAL ENCOUNTER (OUTPATIENT)
Dept: MAMMOGRAPHY | Age: 63
Discharge: HOME | End: 2018-11-28
Attending: OBSTETRICS & GYNECOLOGY
Payer: COMMERCIAL

## 2018-11-28 DIAGNOSIS — Z01.419 ENCOUNTER FOR GYNECOLOGICAL EXAMINATION WITHOUT ABNORMAL FINDING: ICD-10-CM

## 2018-11-28 DIAGNOSIS — Z78.0 MENOPAUSE: ICD-10-CM

## 2018-11-28 PROCEDURE — 77063 BREAST TOMOSYNTHESIS BI: CPT | Performed by: OBSTETRICS & GYNECOLOGY

## 2018-11-28 PROCEDURE — 77067 SCR MAMMO BI INCL CAD: CPT | Performed by: OBSTETRICS & GYNECOLOGY

## 2018-11-30 ENCOUNTER — TELEPHONE (OUTPATIENT)
Dept: INTERNAL MEDICINE CLINIC | Facility: CLINIC | Age: 63
End: 2018-11-30

## 2018-11-30 NOTE — TELEPHONE ENCOUNTER
PT dropped off [facility]  She would like Dr to call and discuss the medication dosage, should she take 60MG or 70MG

## 2018-12-03 NOTE — TELEPHONE ENCOUNTER
Received TSH+T4 labs dated 11/19/18. Current dose levothyroxine 137mcg. Please advise on dosing. Entered results in external result console as able.      Spoke with pt, she forwarded copy of lab because of discussion from last OV with Dr Efe Maier wondering

## 2018-12-05 NOTE — TELEPHONE ENCOUNTER
As per Dr. Andreina Fernandez note - continue present management with levothyroxine dose of 137 mcg daily. Discussed Vyvanse dosage. She feels she becomes intolerant to 60 mg.  Okay to try 70 mg of Vyvanse for the next 2 months.   Patient will call the office with a

## 2018-12-17 NOTE — TELEPHONE ENCOUNTER
Patient discussed changing her dosage of Lisdexamfetamine Dimesylate (VYVANSE) 60 MG Oral Cap  from 60 MG to 70 MG with Dr Remi Molina. Dr Remi Molina asked her to call to request the new prescriptions.   Patient will need paper scripts for a 30 day supply of th

## 2018-12-18 NOTE — TELEPHONE ENCOUNTER
The patient called and left a message requesting an increase in her Vyvanse from 60 mg daily to 70 mg daily, and requested two printed scripts: one for January and one for February. Is this okay? Pended medication order and routed to Dr. Carla Reddy.      Note

## 2018-12-20 NOTE — TELEPHONE ENCOUNTER
The patient called again today f/u on prescription requesting an increase in her Vyvanse from 60 mg daily to 70 mg daily, and requested two printed scripts: one for January and one for February. Is this okay?  Pended medication order and routed to Dr. Rebel Austin

## 2018-12-20 NOTE — TELEPHONE ENCOUNTER
Patient following up regarding increase in her medication. Patient stated she will be in area around 1 p.m today if possible to be done by then. Please advise. Thank you!

## 2018-12-27 NOTE — TELEPHONE ENCOUNTER
Noted below. Prior authorization has been submitted for the patient's Vyvanse through covermymeds. Received the below message: Your demographic data has been sent to [employer] successfully.  They will respond shortly with your clinical questions and you will b

## 2018-12-27 NOTE — TELEPHONE ENCOUNTER
Incoming (mail or fax):   Fax  Received from:  [facility]  Documentation given to:  Left documentation in triage incoming bin    **Prior [facility]

## 2018-12-28 NOTE — TELEPHONE ENCOUNTER
Approval received for Vyvanse 70mg,spoke to pharmacy they are aware. LM for patient to let her know she does not need to  the 60mg scripted, cancelled 60mg script. PA approval in folder in triage office.

## 2018-12-28 NOTE — TELEPHONE ENCOUNTER
Patient is wondering if she can have a short term dose of the Vyvanse 60mg so she is not out of the medication until she gets the 70mg approved. Order pended. Patient okay to  today at 2:30pm. Routed to Dr. Hafsa Salamanca

## 2018-12-28 NOTE — TELEPHONE ENCOUNTER
(Key: [id number])- Reviewed Covermymeds Prior Authorization, no approval yet.  LM for patient to let her know we are still waiting for medication approval.

## 2018-12-28 NOTE — TELEPHONE ENCOUNTER
FYI - Patient called in check on the status of this medication as she is out. Please advise patient once authorization has been received. Thanks!

## 2019-01-04 ENCOUNTER — TELEPHONE (OUTPATIENT)
Dept: INTERNAL MEDICINE CLINIC | Facility: CLINIC | Age: 64
End: 2019-01-04

## 2019-01-04 NOTE — TELEPHONE ENCOUNTER
This is a Printed Script, Order pended. Please sign and print and I will put in an envelope for patient to  Monday. Routed to Dr. Alpesh Robles.

## 2019-01-04 NOTE — TELEPHONE ENCOUNTER
The patient called stating that her Vyvanse was recently increased from 60 mg daily to 70 mg daily via phone (see TE from 12/17/18).  She stated that two days after she started taking the increased dose (70 mg daily) she started feeling nauseous and dizzy,

## 2019-01-04 NOTE — TELEPHONE ENCOUNTER
Called pt to let her know her Vyvanse was ready to be picked up, she stated she did not know she needed to be tapered off so I explained what to do per Dr. Jose D Justin instructions, pt expressed understandind. She said she will pick it up Monday.

## 2019-01-04 NOTE — TELEPHONE ENCOUNTER
FYI - Patient stopped into office and picked up the RX. Patient noted she will not be taking this medication again until at least Monday. Thanks!

## 2019-01-04 NOTE — TELEPHONE ENCOUNTER
The Vyvance needs to be tapered off. Call in Vyvance 10 mg capsules. Take 60 mg x 5 days, 50 mg x 5 days, 40 mg x 5 days, 30 mg x 5 days, 20 mg x 5 days, 10 mg x 5 days then stop.

## 2019-01-04 NOTE — TELEPHONE ENCOUNTER
Patient stating she may be off the 70mg until Monday due to possible prior auth and not wanting to take the 70mg tablets. Spoke to Dr. Rasheeda Houston, she states that the pharmacy should be able to fill the prescription, if not follow up with office on Monday.

## 2019-01-08 ENCOUNTER — TELEPHONE (OUTPATIENT)
Dept: INTERNAL MEDICINE CLINIC | Facility: CLINIC | Age: 64
End: 2019-01-08

## 2019-01-08 NOTE — TELEPHONE ENCOUNTER
[place] [facility] a notification that the patient's Vyvanse has been rejected by the insurance. Went to [email or web address] and initiated a prior authorization under Key #[id number].  Received the following confirmation:    Your demographic data has

## 2019-01-09 NOTE — TELEPHONE ENCOUNTER
Received and answered clinical questions for Vyvanse. Your information has been submitted to [facility]. If [facility] has not responded to your request within 24 hours, contact [facility] at [phone].

## 2019-01-09 NOTE — TELEPHONE ENCOUNTER
Incoming (mail or fax):   Fax  Received from:  [facility]  Documentation given to:  Left documentation in triage incoming bin

## 2019-01-10 NOTE — TELEPHONE ENCOUNTER
Called and spoke with the patient. On Friday when she was not able to refill the new prescription she spoke to the pharmacist who advised to just stop taking Vyvanse and go \"cold turkey\" and just treat the symptoms such as headache and fatigue.   Patient

## 2019-08-24 ENCOUNTER — HOSPITAL ENCOUNTER (OUTPATIENT)
Age: 64
Discharge: HOME | End: 2019-08-24
Attending: FAMILY MEDICINE
Payer: COMMERCIAL

## 2019-08-24 VITALS
BODY MASS INDEX: 29 KG/M2 | HEART RATE: 76 BPM | SYSTOLIC BLOOD PRESSURE: 160 MMHG | WEIGHT: 190.06 LBS | TEMPERATURE: 98 F | RESPIRATION RATE: 16 BRPM | OXYGEN SATURATION: 100 % | DIASTOLIC BLOOD PRESSURE: 80 MMHG

## 2019-08-24 DIAGNOSIS — R42 VERTIGO: Primary | ICD-10-CM

## 2019-08-24 PROCEDURE — 99204 OFFICE O/P NEW MOD 45 MIN: CPT

## 2019-08-24 PROCEDURE — 99213 OFFICE O/P EST LOW 20 MIN: CPT

## 2019-08-24 RX ORDER — MECLIZINE HCL 12.5 MG/1
25 TABLET ORAL ONCE
Status: COMPLETED | OUTPATIENT
Start: 2019-08-24 | End: 2019-08-24

## 2019-08-24 RX ORDER — ONDANSETRON 4 MG/1
4 TABLET, ORALLY DISINTEGRATING ORAL ONCE
Status: COMPLETED | OUTPATIENT
Start: 2019-08-24 | End: 2019-08-24

## 2019-08-24 RX ORDER — ONDANSETRON 4 MG/1
4 TABLET, ORALLY DISINTEGRATING ORAL EVERY 4 HOURS PRN
Qty: 10 TABLET | Refills: 0 | Status: SHIPPED | OUTPATIENT
Start: 2019-08-24 | End: 2019-08-31

## 2019-08-24 RX ORDER — MECLIZINE HYDROCHLORIDE 25 MG/1
25 TABLET ORAL 3 TIMES DAILY PRN
Qty: 21 TABLET | Refills: 0 | Status: SHIPPED | OUTPATIENT
Start: 2019-08-24 | End: 2019-08-31

## 2019-08-24 NOTE — ED INITIAL ASSESSMENT (HPI)
Hx of ear wax build-up, states when this happens becomes dizzy & nauseated. R ear pain today w dizzy & nausea. Denies vomiting.

## 2019-12-02 ENCOUNTER — HOSPITAL ENCOUNTER (OUTPATIENT)
Dept: MAMMOGRAPHY | Age: 64
Discharge: HOME | End: 2019-12-02
Attending: OBSTETRICS & GYNECOLOGY
Payer: COMMERCIAL

## 2019-12-02 DIAGNOSIS — Z01.419 ENCOUNTER FOR GYNECOLOGICAL EXAMINATION WITHOUT ABNORMAL FINDING: ICD-10-CM

## 2019-12-02 PROCEDURE — 77063 BREAST TOMOSYNTHESIS BI: CPT | Performed by: OBSTETRICS & GYNECOLOGY

## 2019-12-02 PROCEDURE — 77067 SCR MAMMO BI INCL CAD: CPT | Performed by: OBSTETRICS & GYNECOLOGY

## 2019-12-06 ENCOUNTER — HOSPITAL ENCOUNTER (OUTPATIENT)
Dept: MAMMOGRAPHY | Facility: HOSPITAL | Age: 64
Discharge: HOME | End: 2019-12-06
Attending: OBSTETRICS & GYNECOLOGY
Payer: COMMERCIAL

## 2019-12-06 DIAGNOSIS — R92.8 FOLLOW-UP EXAMINATION OF ABNORMAL MAMMOGRAM: ICD-10-CM

## 2019-12-06 DIAGNOSIS — R92.2 INCONCLUSIVE MAMMOGRAM: ICD-10-CM

## 2019-12-06 PROCEDURE — 77061 BREAST TOMOSYNTHESIS UNI: CPT | Performed by: OBSTETRICS & GYNECOLOGY

## 2019-12-06 PROCEDURE — 77065 DX MAMMO INCL CAD UNI: CPT | Performed by: OBSTETRICS & GYNECOLOGY

## 2019-12-06 PROCEDURE — 76642 ULTRASOUND BREAST LIMITED: CPT | Performed by: OBSTETRICS & GYNECOLOGY

## 2020-01-01 ENCOUNTER — EXTERNAL RECORD (OUTPATIENT)
Dept: HEALTH INFORMATION MANAGEMENT | Facility: OTHER | Age: 65
End: 2020-01-01

## 2020-01-06 ENCOUNTER — HOSPITAL ENCOUNTER (OUTPATIENT)
Dept: MRI IMAGING | Facility: HOSPITAL | Age: 65
Discharge: HOME | End: 2020-01-06
Attending: Other
Payer: COMMERCIAL

## 2020-01-06 DIAGNOSIS — R42 VERTIGO: ICD-10-CM

## 2020-01-06 PROCEDURE — A9575 INJ GADOTERATE MEGLUMI 0.1ML: HCPCS | Performed by: RADIOLOGY

## 2020-01-06 PROCEDURE — 70553 MRI BRAIN STEM W/O & W/DYE: CPT | Performed by: OTHER

## 2020-01-13 ENCOUNTER — PRIOR ORIGINAL RECORDS (OUTPATIENT)
Dept: HEALTH INFORMATION MANAGEMENT | Facility: OTHER | Age: 65
End: 2020-01-13

## 2020-02-18 ENCOUNTER — MED REC SCAN ONLY (OUTPATIENT)
Dept: INTERNAL MEDICINE CLINIC | Facility: CLINIC | Age: 65
End: 2020-02-18

## 2020-03-02 ENCOUNTER — MED REC SCAN ONLY (OUTPATIENT)
Dept: INTERNAL MEDICINE CLINIC | Facility: CLINIC | Age: 65
End: 2020-03-02

## 2020-03-02 RX ORDER — CARBAMAZEPINE 200 MG/1
TABLET ORAL
Qty: 360 TABLET | Refills: 0 | Status: SHIPPED | OUTPATIENT
Start: 2020-03-02 | End: 2020-06-09 | Stop reason: SDUPTHER

## 2020-05-29 ENCOUNTER — E-ADVICE (OUTPATIENT)
Dept: NEUROLOGY | Age: 65
End: 2020-05-29

## 2020-06-01 ENCOUNTER — HOSPITAL ENCOUNTER (OUTPATIENT)
Dept: MAMMOGRAPHY | Age: 65
Discharge: HOME | End: 2020-06-01
Attending: OBSTETRICS & GYNECOLOGY
Payer: COMMERCIAL

## 2020-06-01 DIAGNOSIS — Z09 FOLLOW-UP EXAM, 3-6 MONTHS SINCE PREVIOUS EXAM: ICD-10-CM

## 2020-06-01 DIAGNOSIS — Z01.419 ENCOUNTER FOR GYNECOLOGICAL EXAMINATION WITHOUT ABNORMAL FINDING: ICD-10-CM

## 2020-06-01 PROCEDURE — 77061 BREAST TOMOSYNTHESIS UNI: CPT | Performed by: OBSTETRICS & GYNECOLOGY

## 2020-06-01 PROCEDURE — 77065 DX MAMMO INCL CAD UNI: CPT | Performed by: OBSTETRICS & GYNECOLOGY

## 2020-06-05 ENCOUNTER — OFF PREMISE (OUTPATIENT)
Dept: NEUROLOGY | Age: 65
End: 2020-06-05

## 2020-06-09 ENCOUNTER — TELEPHONE (OUTPATIENT)
Dept: NEUROLOGY | Age: 65
End: 2020-06-09

## 2020-06-09 ENCOUNTER — V-VISIT (OUTPATIENT)
Dept: NEUROLOGY | Age: 65
End: 2020-06-09

## 2020-06-09 DIAGNOSIS — G40.909 SEIZURE DISORDER (CMD): ICD-10-CM

## 2020-06-09 DIAGNOSIS — G40.909 SEIZURE DISORDER (CMD): Primary | ICD-10-CM

## 2020-06-09 DIAGNOSIS — H81.10 BENIGN PAROXYSMAL POSITIONAL VERTIGO, UNSPECIFIED LATERALITY: ICD-10-CM

## 2020-06-09 PROCEDURE — 99213 OFFICE O/P EST LOW 20 MIN: CPT | Performed by: PSYCHIATRY & NEUROLOGY

## 2020-06-09 RX ORDER — CARBAMAZEPINE 200 MG/1
TABLET ORAL
Qty: 360 TABLET | Refills: 0 | OUTPATIENT
Start: 2020-06-09

## 2020-06-09 RX ORDER — CARBAMAZEPINE 200 MG/1
400 TABLET ORAL 2 TIMES DAILY
Qty: 360 TABLET | Refills: 3 | Status: SHIPPED | OUTPATIENT
Start: 2020-06-09 | End: 2021-06-10 | Stop reason: SDUPTHER

## 2020-06-09 RX ORDER — LEVOTHYROXINE SODIUM 137 UG/1
TABLET ORAL
COMMUNITY
Start: 2020-04-09

## 2020-06-09 SDOH — HEALTH STABILITY: MENTAL HEALTH: HOW OFTEN DO YOU HAVE A DRINK CONTAINING ALCOHOL?: NEVER

## 2020-06-23 ENCOUNTER — OFFICE VISIT (OUTPATIENT)
Dept: INTERNAL MEDICINE CLINIC | Facility: CLINIC | Age: 65
End: 2020-06-23
Payer: COMMERCIAL

## 2020-06-23 VITALS
OXYGEN SATURATION: 97 % | DIASTOLIC BLOOD PRESSURE: 70 MMHG | HEART RATE: 76 BPM | SYSTOLIC BLOOD PRESSURE: 124 MMHG | BODY MASS INDEX: 29 KG/M2 | RESPIRATION RATE: 18 BRPM | HEIGHT: 68 IN | TEMPERATURE: 98 F

## 2020-06-23 DIAGNOSIS — D22.9 MULTIPLE ATYPICAL SKIN MOLES: Primary | ICD-10-CM

## 2020-06-23 PROCEDURE — 99213 OFFICE O/P EST LOW 20 MIN: CPT | Performed by: NURSE PRACTITIONER

## 2020-09-09 ENCOUNTER — HOSPITAL ENCOUNTER (OUTPATIENT)
Dept: ULTRASOUND IMAGING | Age: 65
Discharge: HOME | End: 2020-09-09
Attending: INTERNAL MEDICINE
Payer: COMMERCIAL

## 2020-09-09 DIAGNOSIS — E03.9 ACQUIRED HYPOTHYROIDISM: ICD-10-CM

## 2020-09-09 PROCEDURE — 76536 US EXAM OF HEAD AND NECK: CPT | Performed by: INTERNAL MEDICINE

## 2020-09-14 ENCOUNTER — TELEPHONE (OUTPATIENT)
Dept: INTERNAL MEDICINE CLINIC | Facility: CLINIC | Age: 65
End: 2020-09-14

## 2020-09-14 DIAGNOSIS — Z20.822 CLOSE EXPOSURE TO COVID-19 VIRUS: Primary | ICD-10-CM

## 2020-09-14 NOTE — TELEPHONE ENCOUNTER
Patient says her  had a covid test done on 9/11/2020 and it was positive - she says her last contact was on 9/2 and she has not had any symptoms since.  She is still asking if we can place an order for a covid test so she can be sure she does not

## 2020-09-15 ENCOUNTER — APPOINTMENT (OUTPATIENT)
Dept: LAB | Age: 65
End: 2020-09-15
Attending: INTERNAL MEDICINE
Payer: COMMERCIAL

## 2020-09-15 DIAGNOSIS — Z20.822 CLOSE EXPOSURE TO COVID-19 VIRUS: ICD-10-CM

## 2020-09-19 LAB — SARS-COV-2 BY PCR: NOT DETECTED

## 2020-12-11 ENCOUNTER — V-VISIT (OUTPATIENT)
Dept: NEUROLOGY | Age: 65
End: 2020-12-11

## 2020-12-11 DIAGNOSIS — H81.10 BENIGN PAROXYSMAL POSITIONAL VERTIGO, UNSPECIFIED LATERALITY: ICD-10-CM

## 2020-12-11 DIAGNOSIS — G40.909 SEIZURE DISORDER (CMD): Primary | ICD-10-CM

## 2020-12-11 PROCEDURE — 99213 OFFICE O/P EST LOW 20 MIN: CPT | Performed by: PSYCHIATRY & NEUROLOGY

## 2020-12-11 SDOH — HEALTH STABILITY: MENTAL HEALTH: HOW OFTEN DO YOU HAVE A DRINK CONTAINING ALCOHOL?: NEVER

## 2020-12-14 ENCOUNTER — TELEPHONE (OUTPATIENT)
Dept: NEUROLOGY | Age: 65
End: 2020-12-14

## 2020-12-19 ENCOUNTER — HOSPITAL ENCOUNTER (OUTPATIENT)
Dept: MAMMOGRAPHY | Age: 65
Discharge: HOME | End: 2020-12-19
Attending: OBSTETRICS & GYNECOLOGY
Payer: COMMERCIAL

## 2020-12-19 DIAGNOSIS — Z12.31 ENCOUNTER FOR SCREENING MAMMOGRAM FOR MALIGNANT NEOPLASM OF BREAST: ICD-10-CM

## 2020-12-19 DIAGNOSIS — Z01.419 ENCOUNTER FOR GYNECOLOGICAL EXAMINATION WITHOUT ABNORMAL FINDING: ICD-10-CM

## 2020-12-19 PROCEDURE — 77063 BREAST TOMOSYNTHESIS BI: CPT | Performed by: OBSTETRICS & GYNECOLOGY

## 2020-12-19 PROCEDURE — 77067 SCR MAMMO BI INCL CAD: CPT | Performed by: OBSTETRICS & GYNECOLOGY

## 2021-01-11 ENCOUNTER — TELEPHONE (OUTPATIENT)
Dept: NEUROLOGY | Age: 66
End: 2021-01-11

## 2021-01-12 ENCOUNTER — TELEPHONE (OUTPATIENT)
Dept: NEUROLOGY | Age: 66
End: 2021-01-12

## 2021-01-12 ENCOUNTER — EXTERNAL RECORD (OUTPATIENT)
Dept: NEUROLOGY | Age: 66
End: 2021-01-12

## 2021-06-10 ENCOUNTER — V-VISIT (OUTPATIENT)
Dept: NEUROLOGY | Age: 66
End: 2021-06-10

## 2021-06-10 DIAGNOSIS — G40.909 SEIZURE DISORDER (CMD): Primary | ICD-10-CM

## 2021-06-10 PROCEDURE — 99213 OFFICE O/P EST LOW 20 MIN: CPT | Performed by: PSYCHIATRY & NEUROLOGY

## 2021-06-10 RX ORDER — ERGOCALCIFEROL 1.25 MG/1
CAPSULE ORAL
COMMUNITY
Start: 2021-05-19 | End: 2023-01-06 | Stop reason: ALTCHOICE

## 2021-06-10 RX ORDER — CARBAMAZEPINE 200 MG/1
400 TABLET ORAL 2 TIMES DAILY
Qty: 360 TABLET | Refills: 3 | Status: SHIPPED | OUTPATIENT
Start: 2021-06-10 | End: 2022-06-29 | Stop reason: SDUPTHER

## 2021-07-07 ENCOUNTER — TELEPHONE (OUTPATIENT)
Dept: INTERNAL MEDICINE CLINIC | Facility: CLINIC | Age: 66
End: 2021-07-07

## 2021-07-20 ENCOUNTER — OFFICE VISIT (OUTPATIENT)
Dept: INTERNAL MEDICINE CLINIC | Facility: CLINIC | Age: 66
End: 2021-07-20
Payer: COMMERCIAL

## 2021-07-20 VITALS
OXYGEN SATURATION: 97 % | RESPIRATION RATE: 16 BRPM | DIASTOLIC BLOOD PRESSURE: 86 MMHG | HEIGHT: 68 IN | WEIGHT: 185 LBS | SYSTOLIC BLOOD PRESSURE: 136 MMHG | BODY MASS INDEX: 28.04 KG/M2 | HEART RATE: 78 BPM | TEMPERATURE: 98 F

## 2021-07-20 DIAGNOSIS — Z01.818 PREOP EXAMINATION: Primary | ICD-10-CM

## 2021-07-20 DIAGNOSIS — E03.9 ACQUIRED HYPOTHYROIDISM: ICD-10-CM

## 2021-07-20 DIAGNOSIS — G40.909 SEIZURE DISORDER (HCC): ICD-10-CM

## 2021-07-20 DIAGNOSIS — M77.31 POSTERIOR CALCANEAL EXOSTOSIS, RIGHT: ICD-10-CM

## 2021-07-20 PROBLEM — F50.819 BINGE EATING DISORDER: Status: RESOLVED | Noted: 2018-08-24 | Resolved: 2021-07-20

## 2021-07-20 PROBLEM — F50.81 BINGE EATING DISORDER: Status: RESOLVED | Noted: 2018-08-24 | Resolved: 2021-07-20

## 2021-07-20 PROCEDURE — 3008F BODY MASS INDEX DOCD: CPT | Performed by: PHYSICIAN ASSISTANT

## 2021-07-20 PROCEDURE — 3079F DIAST BP 80-89 MM HG: CPT | Performed by: PHYSICIAN ASSISTANT

## 2021-07-20 PROCEDURE — 3075F SYST BP GE 130 - 139MM HG: CPT | Performed by: PHYSICIAN ASSISTANT

## 2021-07-20 PROCEDURE — 99214 OFFICE O/P EST MOD 30 MIN: CPT | Performed by: PHYSICIAN ASSISTANT

## 2021-07-20 RX ORDER — FLAXSEED OIL
OIL (ML) MISCELLANEOUS
COMMUNITY

## 2021-07-20 NOTE — H&P
Bailee Liz is a 76year old year old female who presents for a pre-operative physical exam.  Patient is to have right calcaneal exostectomy achilles tendon debridement and repair, gastrocnemius recession, to be done by Dr. Boyd Greenfield at [facility] fibroid (11/2004), and Uterine myoma (2/2005).   Surgical Hx:  has a past surgical history that includes other surgical history (2010); colonoscopy (6/21/2006); other surgical history (1/30/2006); electrocardiogram, complete (6/21/2005); oophorectomy (Bilat nourished, in no apparent distress  SKIN: no rashes, no suspicious lesions. Warm and dry.   HEENT: atraumatic, normocephalic, ears and throat are clear  EYES: PERRLA, EOMI, normal optic disk, conjunctiva are clear  NECK: supple, no adenopathy, no bruits b/

## 2021-08-11 PROBLEM — E04.1 NODULAR THYROID DISEASE: Status: ACTIVE | Noted: 2021-08-11

## 2021-08-16 ENCOUNTER — MED REC SCAN ONLY (OUTPATIENT)
Dept: INTERNAL MEDICINE CLINIC | Facility: CLINIC | Age: 66
End: 2021-08-16

## 2021-10-18 ENCOUNTER — MED REC SCAN ONLY (OUTPATIENT)
Dept: INTERNAL MEDICINE CLINIC | Facility: CLINIC | Age: 66
End: 2021-10-18

## 2021-12-07 ENCOUNTER — V-VISIT (OUTPATIENT)
Dept: NEUROLOGY | Age: 66
End: 2021-12-07

## 2021-12-07 DIAGNOSIS — Z86.69 HISTORY OF SEIZURE DISORDER: Primary | ICD-10-CM

## 2021-12-07 PROCEDURE — 99213 OFFICE O/P EST LOW 20 MIN: CPT | Performed by: PSYCHIATRY & NEUROLOGY

## 2021-12-08 ENCOUNTER — TELEPHONE (OUTPATIENT)
Dept: NEUROLOGY | Age: 66
End: 2021-12-08

## 2021-12-08 DIAGNOSIS — Z86.69 HISTORY OF SEIZURE DISORDER: Primary | ICD-10-CM

## 2021-12-20 ENCOUNTER — HOSPITAL ENCOUNTER (OUTPATIENT)
Dept: MAMMOGRAPHY | Age: 66
Discharge: HOME | End: 2021-12-20
Attending: OBSTETRICS & GYNECOLOGY
Payer: COMMERCIAL

## 2021-12-20 DIAGNOSIS — Z01.419 ENCOUNTER FOR GYNECOLOGICAL EXAMINATION WITHOUT ABNORMAL FINDING: ICD-10-CM

## 2021-12-20 PROCEDURE — 77063 BREAST TOMOSYNTHESIS BI: CPT | Performed by: OBSTETRICS & GYNECOLOGY

## 2021-12-20 PROCEDURE — 77067 SCR MAMMO BI INCL CAD: CPT | Performed by: OBSTETRICS & GYNECOLOGY

## 2022-04-22 ENCOUNTER — TELEPHONE (OUTPATIENT)
Dept: NEUROLOGY | Age: 67
End: 2022-04-22

## 2022-05-10 NOTE — TELEPHONE ENCOUNTER
Incoming (mail or fax):   Fax  Received from:  [facility]  Documentation given to:  Left documentation in triage incoming bin    **Approval notice Patient was bending to pull up pants, lower back pain since friday

## 2022-05-31 ENCOUNTER — TELEPHONE (OUTPATIENT)
Dept: NEUROLOGY | Age: 67
End: 2022-05-31

## 2022-05-31 DIAGNOSIS — G40.909 SEIZURE DISORDER (CMD): Primary | ICD-10-CM

## 2022-06-01 ENCOUNTER — LAB SERVICES (OUTPATIENT)
Dept: LAB | Age: 67
End: 2022-06-01

## 2022-06-01 ENCOUNTER — LAB REQUISITION (OUTPATIENT)
Dept: LAB | Age: 67
End: 2022-06-01

## 2022-06-01 DIAGNOSIS — G40.909 SEIZURE DISORDER (CMD): ICD-10-CM

## 2022-06-01 DIAGNOSIS — G40.909 EPILEPSY, UNSPECIFIED, NOT INTRACTABLE, WITHOUT STATUS EPILEPTICUS (CMD): ICD-10-CM

## 2022-06-01 LAB
ALBUMIN SERPL-MCNC: 4.2 G/DL (ref 3.6–5.1)
ALP SERPL-CCNC: 149 U/L (ref 45–130)
ALT SERPL W/O P-5'-P-CCNC: 16 U/L (ref 4–38)
AST SERPL-CCNC: 32 U/L (ref 14–43)
BASOPHIL %: 1.6 % (ref 0–1.2)
BASOPHIL ABSOLUTE #: 0.1 10*3/UL (ref 0–0.1)
BILIRUB SERPL-MCNC: 0.7 MG/DL (ref 0–1.3)
BUN SERPL-MCNC: 12 MG/DL (ref 7–20)
CALCIUM SERPL-MCNC: 9.2 MG/DL (ref 8.6–10.6)
CARBAMAZEPINE (TEGRETOL): NORMAL UG/ML
CARBAMAZEPINE SERPL-MCNC: 11.3 MCG/ML (ref 4–12)
CARBAMAZEPINE SERPL-MCNC: 11.3 MCG/ML (ref 4–12)
CHLORIDE SERPL-SCNC: 101 MMOL/L (ref 96–107)
CO2 SERPL-SCNC: 23 MMOL/L (ref 22–32)
CREAT SERPL-MCNC: 0.6 MG/DL (ref 0.5–1.4)
DIFFERENTIAL TYPE: ABNORMAL
EOSINOPHIL %: 2.8 % (ref 0–10)
EOSINOPHIL ABSOLUTE #: 0.1 10*3/UL (ref 0–0.5)
GFR SERPL CREATININE-BSD FRML MDRD: >60 ML/MIN/{1.73M2}
GFR SERPL CREATININE-BSD FRML MDRD: >60 ML/MIN/{1.73M2}
GLUCOSE SERPL-MCNC: 100 MG/DL (ref 70–200)
HEMATOCRIT: 39.3 % (ref 34–45)
HEMOGLOBIN: 13.8 G/DL (ref 11.2–15.7)
IMMATURE GRANULOCYTE ABSOLUTE: 0.01 10*3/UL (ref 0–0.05)
IMMATURE GRANULOCYTE PERCENT: 0.2 % (ref 0–0.5)
LYMPH PERCENT: 32.4 % (ref 20.5–51.1)
LYMPHOCYTE ABSOLUTE #: 1.4 10*3/UL (ref 1.2–3.4)
MEAN CORPUSCULAR HGB CONCENTRATION: 35.1 % (ref 32–36)
MEAN CORPUSCULAR HGB: 32 PG (ref 27–34)
MEAN CORPUSCULAR VOLUME: 91.2 FL (ref 79–95)
MEAN PLATELET VOLUME: 10 FL (ref 8.6–12.4)
MONOCYTE ABSOLUTE #: 0.5 10*3/UL (ref 0.2–0.9)
MONOCYTE PERCENT: 12.1 % (ref 4.3–12.9)
NEUTROPHIL ABSOLUTE #: 2.2 10*3/UL (ref 1.4–6.5)
NEUTROPHIL PERCENT: 50.9 % (ref 34–73.5)
PLATELET COUNT: 220 10*3/UL (ref 150–400)
POTASSIUM SERPL-SCNC: 5.1 MMOL/L (ref 3.5–5.3)
PROT SERPL-MCNC: 6.9 G/DL (ref 6.4–8.5)
RED BLOOD CELL COUNT: 4.31 10*6/UL (ref 3.7–5.2)
RED CELL DISTRIBUTION WIDTH: 11.9 % (ref 11.3–14.8)
SODIUM SERPL-SCNC: 134 MMOL/L (ref 136–146)
WHITE BLOOD CELL COUNT: 4.3 10*3/UL (ref 4–10)

## 2022-06-01 PROCEDURE — 85025 COMPLETE CBC W/AUTO DIFF WBC: CPT | Performed by: PSYCHIATRY & NEUROLOGY

## 2022-06-01 PROCEDURE — 80156 ASSAY CARBAMAZEPINE TOTAL: CPT | Performed by: PSYCHIATRY & NEUROLOGY

## 2022-06-01 PROCEDURE — PSEU8244 CARBAMAZEPINE: Performed by: CLINICAL MEDICAL LABORATORY

## 2022-06-01 PROCEDURE — 80156 ASSAY CARBAMAZEPINE TOTAL: CPT | Performed by: CLINICAL MEDICAL LABORATORY

## 2022-06-01 PROCEDURE — 80053 COMPREHEN METABOLIC PANEL: CPT | Performed by: PSYCHIATRY & NEUROLOGY

## 2022-06-01 PROCEDURE — 36415 COLL VENOUS BLD VENIPUNCTURE: CPT | Performed by: PSYCHIATRY & NEUROLOGY

## 2022-06-03 ENCOUNTER — TELEPHONE (OUTPATIENT)
Dept: NEUROLOGY | Age: 67
End: 2022-06-03

## 2022-06-07 ENCOUNTER — APPOINTMENT (OUTPATIENT)
Dept: NEUROLOGY | Age: 67
End: 2022-06-07

## 2022-06-29 ENCOUNTER — V-VISIT (OUTPATIENT)
Dept: NEUROLOGY | Age: 67
End: 2022-06-29

## 2022-06-29 DIAGNOSIS — G40.909 SEIZURE DISORDER (CMD): Primary | ICD-10-CM

## 2022-06-29 PROCEDURE — 99214 OFFICE O/P EST MOD 30 MIN: CPT | Performed by: PSYCHIATRY & NEUROLOGY

## 2022-06-29 RX ORDER — CARBAMAZEPINE 200 MG/1
400 TABLET ORAL 2 TIMES DAILY
Qty: 360 TABLET | Refills: 3 | Status: SHIPPED | OUTPATIENT
Start: 2022-06-29 | End: 2023-01-06 | Stop reason: SDUPTHER

## 2022-07-05 ENCOUNTER — HOSPITAL ENCOUNTER (OUTPATIENT)
Dept: BONE DENSITY | Age: 67
Discharge: HOME | End: 2022-07-05
Attending: OBSTETRICS & GYNECOLOGY
Payer: COMMERCIAL

## 2022-07-05 DIAGNOSIS — Z01.419 ENCOUNTER FOR GYNECOLOGICAL EXAMINATION WITHOUT ABNORMAL FINDING: ICD-10-CM

## 2022-07-05 PROCEDURE — 77080 DXA BONE DENSITY AXIAL: CPT | Performed by: OBSTETRICS & GYNECOLOGY

## 2022-07-20 ENCOUNTER — OFFICE VISIT (OUTPATIENT)
Facility: LOCATION | Age: 67
End: 2022-07-20
Payer: COMMERCIAL

## 2022-07-20 VITALS
BODY MASS INDEX: 25.31 KG/M2 | SYSTOLIC BLOOD PRESSURE: 146 MMHG | WEIGHT: 167 LBS | HEART RATE: 73 BPM | HEIGHT: 68 IN | DIASTOLIC BLOOD PRESSURE: 85 MMHG

## 2022-07-20 DIAGNOSIS — H61.23 BILATERAL IMPACTED CERUMEN: Primary | ICD-10-CM

## 2022-07-20 PROCEDURE — 3077F SYST BP >= 140 MM HG: CPT | Performed by: OTOLARYNGOLOGY

## 2022-07-20 PROCEDURE — 3079F DIAST BP 80-89 MM HG: CPT | Performed by: OTOLARYNGOLOGY

## 2022-07-20 PROCEDURE — 99213 OFFICE O/P EST LOW 20 MIN: CPT | Performed by: OTOLARYNGOLOGY

## 2022-07-20 PROCEDURE — 3008F BODY MASS INDEX DOCD: CPT | Performed by: OTOLARYNGOLOGY

## 2022-07-23 ENCOUNTER — OFFICE VISIT (OUTPATIENT)
Dept: INTERNAL MEDICINE CLINIC | Facility: CLINIC | Age: 67
End: 2022-07-23
Payer: COMMERCIAL

## 2022-07-23 VITALS
HEART RATE: 74 BPM | DIASTOLIC BLOOD PRESSURE: 72 MMHG | TEMPERATURE: 98 F | BODY MASS INDEX: 26.07 KG/M2 | HEIGHT: 68 IN | OXYGEN SATURATION: 96 % | SYSTOLIC BLOOD PRESSURE: 138 MMHG | WEIGHT: 172 LBS | RESPIRATION RATE: 16 BRPM

## 2022-07-23 DIAGNOSIS — R03.0 ELEVATED BLOOD PRESSURE READING: Primary | ICD-10-CM

## 2022-07-23 DIAGNOSIS — Z91.89 FRAMINGHAM CARDIAC RISK <10% IN NEXT 10 YEARS: ICD-10-CM

## 2022-07-23 DIAGNOSIS — R03.0 WHITE COAT SYNDROME WITHOUT DIAGNOSIS OF HYPERTENSION: ICD-10-CM

## 2022-07-23 PROCEDURE — 99213 OFFICE O/P EST LOW 20 MIN: CPT | Performed by: PHYSICIAN ASSISTANT

## 2022-07-23 PROCEDURE — 3008F BODY MASS INDEX DOCD: CPT | Performed by: PHYSICIAN ASSISTANT

## 2022-07-23 PROCEDURE — 3075F SYST BP GE 130 - 139MM HG: CPT | Performed by: PHYSICIAN ASSISTANT

## 2022-07-23 PROCEDURE — 3078F DIAST BP <80 MM HG: CPT | Performed by: PHYSICIAN ASSISTANT

## 2022-10-17 ENCOUNTER — OFFICE VISIT (OUTPATIENT)
Facility: LOCATION | Age: 67
End: 2022-10-17
Payer: COMMERCIAL

## 2022-10-17 DIAGNOSIS — H61.23 BILATERAL IMPACTED CERUMEN: Primary | ICD-10-CM

## 2022-10-17 PROCEDURE — 99213 OFFICE O/P EST LOW 20 MIN: CPT | Performed by: OTOLARYNGOLOGY

## 2022-12-22 ENCOUNTER — HOSPITAL ENCOUNTER (OUTPATIENT)
Dept: MAMMOGRAPHY | Age: 67
Discharge: HOME | End: 2022-12-22
Attending: OBSTETRICS & GYNECOLOGY
Payer: COMMERCIAL

## 2022-12-22 DIAGNOSIS — Z01.419 ENCOUNTER FOR GYNECOLOGICAL EXAMINATION WITHOUT ABNORMAL FINDING: ICD-10-CM

## 2022-12-22 PROCEDURE — 77067 SCR MAMMO BI INCL CAD: CPT | Performed by: OBSTETRICS & GYNECOLOGY

## 2022-12-22 PROCEDURE — 77063 BREAST TOMOSYNTHESIS BI: CPT | Performed by: OBSTETRICS & GYNECOLOGY

## 2023-01-06 ENCOUNTER — V-VISIT (OUTPATIENT)
Dept: NEUROLOGY | Age: 68
End: 2023-01-06

## 2023-01-06 DIAGNOSIS — G40.909 SEIZURE DISORDER (CMD): Primary | ICD-10-CM

## 2023-01-06 PROCEDURE — 99214 OFFICE O/P EST MOD 30 MIN: CPT | Performed by: PSYCHIATRY & NEUROLOGY

## 2023-01-06 RX ORDER — CHOLECALCIFEROL (VITAMIN D3) 1250 MCG
1.25 CAPSULE ORAL DAILY
COMMUNITY

## 2023-01-06 RX ORDER — CARBAMAZEPINE 200 MG/1
400 TABLET ORAL 2 TIMES DAILY
Qty: 360 TABLET | Refills: 3 | Status: SHIPPED | OUTPATIENT
Start: 2023-01-06

## 2023-01-06 NOTE — PROGRESS NOTES
Pt aware of results and radiology recommendations. Right breast dx mammo ordered. Pt to call PRN. Erythromycin Counseling:  I discussed with the patient the risks of erythromycin including but not limited to GI upset, allergic reaction, drug rash, diarrhea, increase in liver enzymes, and yeast infections.

## 2023-01-16 ENCOUNTER — OFFICE VISIT (OUTPATIENT)
Facility: LOCATION | Age: 68
End: 2023-01-16
Payer: COMMERCIAL

## 2023-01-16 DIAGNOSIS — H61.23 BILATERAL IMPACTED CERUMEN: Primary | ICD-10-CM

## 2023-01-16 PROCEDURE — 99213 OFFICE O/P EST LOW 20 MIN: CPT | Performed by: OTOLARYNGOLOGY

## 2023-04-17 ENCOUNTER — OFFICE VISIT (OUTPATIENT)
Facility: LOCATION | Age: 68
End: 2023-04-17
Payer: COMMERCIAL

## 2023-04-17 DIAGNOSIS — H61.23 BILATERAL IMPACTED CERUMEN: Primary | ICD-10-CM

## 2023-04-17 PROCEDURE — 99213 OFFICE O/P EST LOW 20 MIN: CPT | Performed by: OTOLARYNGOLOGY

## 2023-04-22 ENCOUNTER — OFFICE VISIT (OUTPATIENT)
Dept: INTERNAL MEDICINE CLINIC | Facility: CLINIC | Age: 68
End: 2023-04-22
Payer: COMMERCIAL

## 2023-04-22 VITALS
SYSTOLIC BLOOD PRESSURE: 118 MMHG | HEART RATE: 61 BPM | OXYGEN SATURATION: 95 % | BODY MASS INDEX: 26 KG/M2 | RESPIRATION RATE: 16 BRPM | HEIGHT: 68 IN | DIASTOLIC BLOOD PRESSURE: 74 MMHG

## 2023-04-22 DIAGNOSIS — J30.2 SEASONAL ALLERGIES: ICD-10-CM

## 2023-04-22 DIAGNOSIS — H04.123 DRY EYES, BILATERAL: Primary | ICD-10-CM

## 2023-04-22 PROCEDURE — 3078F DIAST BP <80 MM HG: CPT | Performed by: PHYSICIAN ASSISTANT

## 2023-04-22 PROCEDURE — 99213 OFFICE O/P EST LOW 20 MIN: CPT | Performed by: PHYSICIAN ASSISTANT

## 2023-04-22 PROCEDURE — 3074F SYST BP LT 130 MM HG: CPT | Performed by: PHYSICIAN ASSISTANT

## 2023-05-15 ENCOUNTER — OFFICE VISIT (OUTPATIENT)
Dept: INTERNAL MEDICINE CLINIC | Facility: CLINIC | Age: 68
End: 2023-05-15
Payer: COMMERCIAL

## 2023-05-15 VITALS
SYSTOLIC BLOOD PRESSURE: 124 MMHG | OXYGEN SATURATION: 96 % | RESPIRATION RATE: 18 BRPM | DIASTOLIC BLOOD PRESSURE: 72 MMHG | HEART RATE: 77 BPM

## 2023-05-15 DIAGNOSIS — J20.9 ACUTE BRONCHITIS, UNSPECIFIED ORGANISM: Primary | ICD-10-CM

## 2023-05-15 PROCEDURE — 3074F SYST BP LT 130 MM HG: CPT | Performed by: PHYSICIAN ASSISTANT

## 2023-05-15 PROCEDURE — 3078F DIAST BP <80 MM HG: CPT | Performed by: PHYSICIAN ASSISTANT

## 2023-05-15 PROCEDURE — 99214 OFFICE O/P EST MOD 30 MIN: CPT | Performed by: PHYSICIAN ASSISTANT

## 2023-05-15 RX ORDER — HYDROCODONE POLISTIREX AND CHLORPHENIRAMINE POLISTIREX 10; 8 MG/5ML; MG/5ML
5 SUSPENSION, EXTENDED RELEASE ORAL 2 TIMES DAILY PRN
COMMUNITY

## 2023-05-15 RX ORDER — PREDNISONE 20 MG/1
20 TABLET ORAL DAILY
Qty: 7 TABLET | Refills: 0 | Status: SHIPPED | OUTPATIENT
Start: 2023-05-15 | End: 2023-05-22

## 2023-05-15 NOTE — PATIENT INSTRUCTIONS
Take mucinex (guaifenesin) in the morning with a glass of water   Take prednisone every day with breakfast

## 2023-05-22 ENCOUNTER — OFFICE VISIT (OUTPATIENT)
Dept: INTERNAL MEDICINE CLINIC | Facility: CLINIC | Age: 68
End: 2023-05-22
Payer: COMMERCIAL

## 2023-05-22 ENCOUNTER — TELEPHONE (OUTPATIENT)
Dept: INTERNAL MEDICINE CLINIC | Facility: CLINIC | Age: 68
End: 2023-05-22

## 2023-05-22 VITALS
OXYGEN SATURATION: 97 % | RESPIRATION RATE: 20 BRPM | TEMPERATURE: 98 F | HEART RATE: 77 BPM | SYSTOLIC BLOOD PRESSURE: 124 MMHG | DIASTOLIC BLOOD PRESSURE: 72 MMHG

## 2023-05-22 DIAGNOSIS — J20.9 ACUTE BRONCHITIS, UNSPECIFIED ORGANISM: Primary | ICD-10-CM

## 2023-05-22 PROCEDURE — 3074F SYST BP LT 130 MM HG: CPT | Performed by: NURSE PRACTITIONER

## 2023-05-22 PROCEDURE — 3078F DIAST BP <80 MM HG: CPT | Performed by: NURSE PRACTITIONER

## 2023-05-22 PROCEDURE — 99214 OFFICE O/P EST MOD 30 MIN: CPT | Performed by: NURSE PRACTITIONER

## 2023-05-22 RX ORDER — HYDROCODONE POLISTIREX AND CHLORPHENIRAMINE POLISTIREX 10; 8 MG/5ML; MG/5ML
5 SUSPENSION, EXTENDED RELEASE ORAL 2 TIMES DAILY PRN
Qty: 473 ML | Refills: 0 | Status: SHIPPED | OUTPATIENT
Start: 2023-05-22

## 2023-05-22 NOTE — TELEPHONE ENCOUNTER
Updated details from pharmacy. No call back needed. Pharmcy advising this medication is under classification of: C-2, only 30 day supply is allowed.     Giving 300mls she will loose the rest.    Hydrocod Yahir-Chlorphe Yahir ER 10-8 MG/5ML Oral Suspension Extended Release

## 2023-06-10 ENCOUNTER — HOSPITAL ENCOUNTER (OUTPATIENT)
Age: 68
Discharge: HOME | End: 2023-06-10
Payer: COMMERCIAL

## 2023-06-10 ENCOUNTER — APPOINTMENT (OUTPATIENT)
Dept: GENERAL RADIOLOGY | Age: 68
End: 2023-06-10
Attending: NURSE PRACTITIONER
Payer: COMMERCIAL

## 2023-06-10 VITALS
SYSTOLIC BLOOD PRESSURE: 142 MMHG | RESPIRATION RATE: 20 BRPM | HEART RATE: 63 BPM | DIASTOLIC BLOOD PRESSURE: 63 MMHG | OXYGEN SATURATION: 98 % | BODY MASS INDEX: 25 KG/M2 | WEIGHT: 165 LBS | TEMPERATURE: 98 F

## 2023-06-10 DIAGNOSIS — M25.512 ACUTE PAIN OF LEFT SHOULDER: ICD-10-CM

## 2023-06-10 DIAGNOSIS — M62.838 SPASM OF MUSCLE: ICD-10-CM

## 2023-06-10 DIAGNOSIS — R05.9 COUGH, UNSPECIFIED TYPE: Primary | ICD-10-CM

## 2023-06-10 LAB
#MXD IC: 0.4 X10ˆ3/UL (ref 0.1–1)
BUN BLD-MCNC: 13 MG/DL (ref 7–18)
CHLORIDE BLD-SCNC: 101 MMOL/L (ref 98–112)
CO2 BLD-SCNC: 29 MMOL/L (ref 21–32)
CREAT BLD-MCNC: 0.6 MG/DL
DDIMER WHOLE BLOOD: <200 NG/ML DDU (ref ?–400)
GFR SERPLBLD BASED ON 1.73 SQ M-ARVRAT: 98 ML/MIN/1.73M2 (ref 60–?)
GLUCOSE BLD-MCNC: 95 MG/DL (ref 70–99)
HCT VFR BLD AUTO: 42.8 %
HCT VFR BLD CALC: 44 %
HGB BLD-MCNC: 14.6 G/DL
ISTAT IONIZED CALCIUM FOR CHEM 8: 1.23 MMOL/L (ref 1.12–1.32)
LYMPHOCYTES # BLD AUTO: 1.3 X10ˆ3/UL (ref 1–4)
LYMPHOCYTES NFR BLD AUTO: 25.6 %
MCH RBC QN AUTO: 30.9 PG (ref 26–34)
MCHC RBC AUTO-ENTMCNC: 34.1 G/DL (ref 31–37)
MCV RBC AUTO: 90.5 FL (ref 80–100)
MIXED CELL %: 7.6 %
NEUTROPHILS # BLD AUTO: 3.4 X10ˆ3/UL (ref 1.5–7.7)
NEUTROPHILS NFR BLD AUTO: 66.8 %
PLATELET # BLD AUTO: 270 X10ˆ3/UL (ref 150–450)
POTASSIUM BLD-SCNC: 4.8 MMOL/L (ref 3.6–5.1)
RBC # BLD AUTO: 4.73 X10ˆ6/UL
S PYO AG THROAT QL: NEGATIVE
SODIUM BLD-SCNC: 136 MMOL/L (ref 136–145)
TROPONIN I BLD-MCNC: <0.02 NG/ML
WBC # BLD AUTO: 5.1 X10ˆ3/UL (ref 4–11)

## 2023-06-10 PROCEDURE — 85378 FIBRIN DEGRADE SEMIQUANT: CPT | Performed by: NURSE PRACTITIONER

## 2023-06-10 PROCEDURE — 87880 STREP A ASSAY W/OPTIC: CPT | Performed by: NURSE PRACTITIONER

## 2023-06-10 PROCEDURE — 80047 BASIC METABLC PNL IONIZED CA: CPT | Performed by: NURSE PRACTITIONER

## 2023-06-10 PROCEDURE — 96374 THER/PROPH/DIAG INJ IV PUSH: CPT | Performed by: NURSE PRACTITIONER

## 2023-06-10 PROCEDURE — 85025 COMPLETE CBC W/AUTO DIFF WBC: CPT | Performed by: NURSE PRACTITIONER

## 2023-06-10 PROCEDURE — 71046 X-RAY EXAM CHEST 2 VIEWS: CPT | Performed by: NURSE PRACTITIONER

## 2023-06-10 PROCEDURE — 93000 ELECTROCARDIOGRAM COMPLETE: CPT | Performed by: NURSE PRACTITIONER

## 2023-06-10 PROCEDURE — 84484 ASSAY OF TROPONIN QUANT: CPT | Performed by: NURSE PRACTITIONER

## 2023-06-10 PROCEDURE — 99214 OFFICE O/P EST MOD 30 MIN: CPT | Performed by: NURSE PRACTITIONER

## 2023-06-10 RX ORDER — KETOROLAC TROMETHAMINE 30 MG/ML
30 INJECTION, SOLUTION INTRAMUSCULAR; INTRAVENOUS ONCE
Status: COMPLETED | OUTPATIENT
Start: 2023-06-10 | End: 2023-06-10

## 2023-06-10 RX ORDER — METHYLPREDNISOLONE 4 MG/1
TABLET ORAL
Qty: 1 EACH | Refills: 0 | Status: SHIPPED | OUTPATIENT
Start: 2023-06-10

## 2023-06-10 RX ORDER — CYCLOBENZAPRINE HCL 10 MG
10 TABLET ORAL 3 TIMES DAILY PRN
Qty: 20 TABLET | Refills: 0 | Status: SHIPPED | OUTPATIENT
Start: 2023-06-10 | End: 2023-06-17

## 2023-06-10 NOTE — ED INITIAL ASSESSMENT (HPI)
Stabbing pain to left upper back radiates down left arm. Denies CP or dyspnea. Recent + strep, now has residual cough. Has been taking cough Rx w codien which helps.

## 2023-06-10 NOTE — DISCHARGE INSTRUCTIONS
May take over-the-counter Motrin or Tylenol as needed for pain. Take the medications as prescribed. Remember that the cyclobenzaprine, which is a muscle relaxer may cause drowsiness. Gentle range of motion exercises of the shoulder. Heating pad or the like intermittently several times a day until symptoms resolve. May continue taking her cough medication as needed. Please read the attached discharge instructions. Go to your nearest ER for new or worsening symptoms.

## 2023-06-11 LAB
ATRIAL RATE: 64 BPM
P AXIS: 75 DEGREES
P-R INTERVAL: 156 MS
Q-T INTERVAL: 390 MS
QRS DURATION: 74 MS
QTC CALCULATION (BEZET): 402 MS
R AXIS: 60 DEGREES
T AXIS: 22 DEGREES
VENTRICULAR RATE: 64 BPM

## 2023-07-17 ENCOUNTER — OFFICE VISIT (OUTPATIENT)
Facility: LOCATION | Age: 68
End: 2023-07-17
Payer: COMMERCIAL

## 2023-07-17 DIAGNOSIS — H61.23 BILATERAL IMPACTED CERUMEN: Primary | ICD-10-CM

## 2023-07-17 PROCEDURE — 99213 OFFICE O/P EST LOW 20 MIN: CPT | Performed by: OTOLARYNGOLOGY

## 2023-07-17 NOTE — PROGRESS NOTES
Héctor Reza is a 75year old female. Patient presents with:  Ear Wax    HPI:   Patient is here complaining of plugging in both ears. She denies fevers chills nausea or vomiting. There is smoke in the holden is recently has been bothering her eyes. Current Outpatient Medications   Medication Sig Dispense Refill    methylPREDNISolone (MEDROL) 4 MG Oral Tablet Therapy Pack Dosepack: take as directed 1 each 0    Hydrocod Yahir-Chlorphe Yahir ER 10-8 MG/5ML Oral Suspension Extended Release Take 5 mL by mouth 2 (two) times daily as needed. 473 mL 0    Hydrocod Yahir-Chlorphe Yahir ER 10-8 MG/5ML Oral Suspension Extended Release Take 5 mL by mouth 2 (two) times daily as needed. (Patient not taking: Reported on 5/22/2023)      Cholecalciferol (VITAMIN D3 GUMMIES ADULT OR) Take 3,000 Units by mouth. LEVOXYL 137 MCG Oral Tab TAKE 1 TABLET DAILY 90 tablet 2    Flaxseed Oil (LINSEED OIL) Does not apply Oil       Multiple Vitamins-Minerals (WOMENS MULTIVITAMIN PLUS) Oral Tab Take by mouth. Calcium Carbonate-Vitamin D 600-200 MG-UNIT Oral Tab Take  by mouth 2 (two) times daily. carbamazepine (TEGRETOL) 200 MG Oral Tab Take 2 tablets (400 mg total) by mouth 2 (two) times daily.  Taking 800 daily per pt  2      Past Medical History:   Diagnosis Date    Acute bronchitis 2013    Binge eating disorder 8/24/2018    Diverticulosis 6/2006    mild    Exostosis 2010    H/O bone density study 11/2012    DEXA scan    Hypothyroidism 11/10/2014    Internal hemorrhoids 6/2006    2+    Multiple thyroid nodules 7/2010    2 nodules    Ovarian cyst 2/2005    complex cystic mass Rt adnexa, simple cyst in Lt adnexa    Plantar fascial fibromatosis 2010    Seizure disorder ([address] 11/10/2014    last seizure was in 1997    Uterine fibroid 11/2004    uterine fibroids    Uterine myoma 2/2005    prominent myomas noted in the uterus      Social History:  Social History     Socioeconomic History    Marital status:    Tobacco Use Smoking status: Never    Smokeless tobacco: Never   Vaping Use    Vaping Use: Never used   Substance and Sexual Activity    Alcohol use: Yes     Alcohol/week: 0.0 standard drinks of alcohol     Comment: socially    Drug use: No   Other Topics Concern    Caffeine Concern No    Exercise Yes     Comment: 4 times per week       Past Surgical History:   Procedure Laterality Date    COLONOSCOPY  6/21/2006    to cecum    ELECTROCARDIOGRAM, COMPLETE  6/21/2005    HYSTERECTOMY  2004    total hystero. TORY BIOPSY STEREO NODULE 1 SITE RIGHT (CPT=19081) Right 1995    benign    OOPHORECTOMY Bilateral 2004    total hystero. OTHER SURGICAL HISTORY  2010    achilles heel    OTHER SURGICAL HISTORY  1/30/2006    shave bx, skin mole L forearm         REVIEW OF SYSTEMS:   GENERAL HEALTH: feels well otherwise  GENERAL : denies fever, chills, sweats, weight loss, weight gain  SKIN: denies any unusual skin lesions or rashes  RESPIRATORY: denies shortness of breath with exertion  NEURO: denies headaches    EXAM:   There were no vitals taken for this visit. System Findings Details   Skin Normal Inspection - Normal.   Constitutional Normal Overall appearance - Normal.   Head/Face Normal Facial features - Normal. Eyebrows - Normal. Skull - Normal.   Oral/Oropharynx Normal Lips - Normal, Tonsils - Normal, Tongue - Normal    Nasal Normal External nose - Normal. Nasal septum - Normal, Turbinates - Normal   Neurological Normal Memory - Normal. Cranial nerves - Cranial nerves II through XII grossly intact. Neck Exam Normal Inspection - Normal. Palpation - Normal. Parotid gland - Normal. Thyroid gland - Normal.   Psychiatric Normal Orientation - Oriented to time, place, person & situation. Appropriate mood and affect. Lymph Detail Normal Submental. Submandibular. Anterior cervical. Posterior cervical. Supraclavicular.    Eyes Normal Conjunctiva - Right: Normal, Left: Normal. Pupil - Right: Normal, Left: Normal.    Ears Normal Inspection - Right: Normal, Left: Normal. Canal - Left: Normal. TM - Right: Normal, Left: Normal.     Patients exam showed wax impaction right ear, wax impaction left ear. Ear wax was removed under the operative microscope. No complications. Patient tolerated the procedure well. Ear exam findings listed in note after removal.    ASSESSMENT AND PLAN:   1. Bilateral impacted cerumen  Problem is resolved. She will return in 2 to 3 months for recheck. The patient indicates understanding of these issues and agrees to the plan.       Rhett Silverio MD  7/17/2023  9:01 AM

## 2023-07-24 ENCOUNTER — TELEPHONE (OUTPATIENT)
Dept: INTERNAL MEDICINE CLINIC | Facility: CLINIC | Age: 68
End: 2023-07-24

## 2023-07-24 DIAGNOSIS — I83.90 VARICOSE VEINS: Primary | ICD-10-CM

## 2023-07-24 DIAGNOSIS — I83.93 SPIDER VEINS OF BOTH LOWER EXTREMITIES: ICD-10-CM

## 2023-07-24 NOTE — TELEPHONE ENCOUNTER
Does patient have IHP or MCR Adv HMO:  no    Provider: Reyna Bullock    Specialty: vein       Reason for Visit: spider veins/varicose veins     Has patient seen this specialist before:  no    Number of Visits Requested: TBD     Is Appt.  Already Scheduled: no         Has patient seen our provider for reason:  no

## 2023-07-24 NOTE — TELEPHONE ENCOUNTER
Provider Address Phone   Starla Betancourt, [address] [phone]     Referral information given to patient.

## 2023-07-25 ENCOUNTER — LAB SERVICES (OUTPATIENT)
Dept: LAB | Age: 68
End: 2023-07-25

## 2023-07-25 ENCOUNTER — TELEPHONE (OUTPATIENT)
Dept: NEUROLOGY | Age: 68
End: 2023-07-25

## 2023-07-25 DIAGNOSIS — G40.909 SEIZURE DISORDER (CMD): ICD-10-CM

## 2023-07-25 DIAGNOSIS — G40.909 SEIZURE DISORDER (CMD): Primary | ICD-10-CM

## 2023-07-25 LAB
ALBUMIN SERPL-MCNC: 3.6 G/DL (ref 3.6–5.1)
ALBUMIN/GLOB SERPL: 1.2 {RATIO} (ref 1–2.4)
ALP SERPL-CCNC: 154 UNITS/L (ref 45–117)
ALT SERPL-CCNC: 25 UNITS/L
ANION GAP SERPL CALC-SCNC: 14 MMOL/L (ref 7–19)
AST SERPL-CCNC: 18 UNITS/L
BASOPHILS # BLD: 0.1 K/MCL (ref 0–0.3)
BASOPHILS NFR BLD: 2 %
BILIRUB SERPL-MCNC: 0.5 MG/DL (ref 0.2–1)
BUN SERPL-MCNC: 17 MG/DL (ref 6–20)
BUN/CREAT SERPL: 28 (ref 7–25)
CALCIUM SERPL-MCNC: 8.4 MG/DL (ref 8.4–10.2)
CHLORIDE SERPL-SCNC: 99 MMOL/L (ref 97–110)
CO2 SERPL-SCNC: 26 MMOL/L (ref 21–32)
CREAT SERPL-MCNC: 0.61 MG/DL (ref 0.51–0.95)
DEPRECATED RDW RBC: 40.5 FL (ref 39–50)
EOSINOPHIL # BLD: 0.3 K/MCL (ref 0–0.5)
EOSINOPHIL NFR BLD: 6 %
ERYTHROCYTE [DISTWIDTH] IN BLOOD: 11.8 % (ref 11–15)
FASTING DURATION TIME PATIENT: ABNORMAL H
GFR SERPLBLD BASED ON 1.73 SQ M-ARVRAT: >90 ML/MIN
GLOBULIN SER-MCNC: 3 G/DL (ref 2–4)
GLUCOSE SERPL-MCNC: 90 MG/DL (ref 70–99)
HCT VFR BLD CALC: 41.8 % (ref 36–46.5)
HGB BLD-MCNC: 13.7 G/DL (ref 12–15.5)
IMM GRANULOCYTES # BLD AUTO: 0 K/MCL (ref 0–0.2)
IMM GRANULOCYTES # BLD: 0 %
LYMPHOCYTES # BLD: 1.7 K/MCL (ref 1–4)
LYMPHOCYTES NFR BLD: 37 %
MCH RBC QN AUTO: 30.5 PG (ref 26–34)
MCHC RBC AUTO-ENTMCNC: 32.8 G/DL (ref 32–36.5)
MCV RBC AUTO: 93.1 FL (ref 78–100)
MONOCYTES # BLD: 0.6 K/MCL (ref 0.3–0.9)
MONOCYTES NFR BLD: 12 %
NEUTROPHILS # BLD: 2.1 K/MCL (ref 1.8–7.7)
NEUTROPHILS NFR BLD: 43 %
NRBC BLD MANUAL-RTO: 0 /100 WBC
PLATELET # BLD AUTO: 188 K/MCL (ref 140–450)
POTASSIUM SERPL-SCNC: 4.1 MMOL/L (ref 3.4–5.1)
PROT SERPL-MCNC: 6.6 G/DL (ref 6.4–8.2)
RBC # BLD: 4.49 MIL/MCL (ref 4–5.2)
SODIUM SERPL-SCNC: 135 MMOL/L (ref 135–145)
WBC # BLD: 4.7 K/MCL (ref 4.2–11)

## 2023-07-25 PROCEDURE — 85025 COMPLETE CBC W/AUTO DIFF WBC: CPT | Performed by: INTERNAL MEDICINE

## 2023-07-25 PROCEDURE — 36415 COLL VENOUS BLD VENIPUNCTURE: CPT | Performed by: PSYCHIATRY & NEUROLOGY

## 2023-07-26 ENCOUNTER — TELEPHONE (OUTPATIENT)
Dept: NEUROLOGY | Age: 68
End: 2023-07-26

## 2023-07-27 ENCOUNTER — LAB SERVICES (OUTPATIENT)
Dept: LAB | Age: 68
End: 2023-07-27

## 2023-07-27 DIAGNOSIS — G40.909 SEIZURE DISORDER (CMD): ICD-10-CM

## 2023-07-27 LAB — CARBAMAZEPINE SERPL-MCNC: 13.1 MCG/ML (ref 4–12)

## 2023-07-27 PROCEDURE — 36415 COLL VENOUS BLD VENIPUNCTURE: CPT | Performed by: PSYCHIATRY & NEUROLOGY

## 2023-07-27 PROCEDURE — 80156 ASSAY CARBAMAZEPINE TOTAL: CPT | Performed by: INTERNAL MEDICINE

## 2023-08-10 ENCOUNTER — OFFICE VISIT (OUTPATIENT)
Dept: NEUROLOGY | Age: 68
End: 2023-08-10

## 2023-08-10 VITALS
HEART RATE: 88 BPM | OXYGEN SATURATION: 97 % | BODY MASS INDEX: 24.25 KG/M2 | SYSTOLIC BLOOD PRESSURE: 136 MMHG | DIASTOLIC BLOOD PRESSURE: 82 MMHG | HEIGHT: 68 IN | WEIGHT: 160 LBS | RESPIRATION RATE: 16 BRPM | TEMPERATURE: 96.7 F

## 2023-08-10 DIAGNOSIS — G40.909 SEIZURE DISORDER (CMD): Primary | ICD-10-CM

## 2023-08-10 PROCEDURE — 99213 OFFICE O/P EST LOW 20 MIN: CPT | Performed by: PSYCHIATRY & NEUROLOGY

## 2023-08-10 ASSESSMENT — PAIN SCALES - GENERAL: PAINLEVEL: 0

## 2023-09-11 ENCOUNTER — OFFICE VISIT (OUTPATIENT)
Facility: LOCATION | Age: 68
End: 2023-09-11
Payer: COMMERCIAL

## 2023-09-11 VITALS — HEART RATE: 72 BPM | TEMPERATURE: 99 F

## 2023-09-11 DIAGNOSIS — I83.813 VARICOSE VEINS OF BILATERAL LOWER EXTREMITIES WITH PAIN: Primary | ICD-10-CM

## 2023-09-27 ENCOUNTER — HOSPITAL ENCOUNTER (OUTPATIENT)
Dept: ULTRASOUND IMAGING | Facility: HOSPITAL | Age: 68
Discharge: HOME | End: 2023-09-27
Attending: SURGERY
Payer: COMMERCIAL

## 2023-09-27 DIAGNOSIS — I83.813 VARICOSE VEINS OF BILATERAL LOWER EXTREMITIES WITH PAIN: ICD-10-CM

## 2023-09-27 PROCEDURE — 93970 EXTREMITY STUDY: CPT | Performed by: SURGERY

## 2023-10-16 ENCOUNTER — OFFICE VISIT (OUTPATIENT)
Facility: LOCATION | Age: 68
End: 2023-10-16
Payer: COMMERCIAL

## 2023-10-16 DIAGNOSIS — H61.23 BILATERAL IMPACTED CERUMEN: Primary | ICD-10-CM

## 2023-10-16 PROCEDURE — 99213 OFFICE O/P EST LOW 20 MIN: CPT | Performed by: OTOLARYNGOLOGY

## 2023-10-16 NOTE — PROGRESS NOTES
Sravanthi Huitron is a 75year old female. Patient presents with:  Cerumen Impaction    HPI:   Patient is here for 3-month follow-up regarding wax. She denies fevers chills nausea or vomiting or other constitutional complaints. She will be traveling to [place] in the near future. Current Outpatient Medications   Medication Sig Dispense Refill    methylPREDNISolone (MEDROL) 4 MG Oral Tablet Therapy Pack Dosepack: take as directed 1 each 0    Hydrocod Yahir-Chlorphe Yahir ER 10-8 MG/5ML Oral Suspension Extended Release Take 5 mL by mouth 2 (two) times daily as needed. 473 mL 0    Hydrocod Yahir-Chlorphe Yahir ER 10-8 MG/5ML Oral Suspension Extended Release Take 5 mL by mouth 2 (two) times daily as needed. (Patient not taking: Reported on 5/22/2023)      Cholecalciferol (VITAMIN D3 GUMMIES ADULT OR) Take 3,000 Units by mouth. LEVOXYL 137 MCG Oral Tab TAKE 1 TABLET DAILY 90 tablet 2    Flaxseed Oil (LINSEED OIL) Does not apply Oil       Multiple Vitamins-Minerals (WOMENS MULTIVITAMIN PLUS) Oral Tab Take by mouth. Calcium Carbonate-Vitamin D 600-200 MG-UNIT Oral Tab Take  by mouth 2 (two) times daily. carbamazepine (TEGRETOL) 200 MG Oral Tab Take 2 tablets (400 mg total) by mouth 2 (two) times daily.  Taking 800 daily per pt  2      Past Medical History:   Diagnosis Date    Acute bronchitis 2013    Binge eating disorder 8/24/2018    Diverticulosis 6/2006    mild    Exostosis 2010    H/O bone density study 11/2012    DEXA scan    Hypothyroidism 11/10/2014    Internal hemorrhoids 6/2006    2+    Multiple thyroid nodules 7/2010    2 nodules    Ovarian cyst 2/2005    complex cystic mass Rt adnexa, simple cyst in Lt adnexa    Plantar fascial fibromatosis 2010    Seizure disorder ([address] 11/10/2014    last seizure was in 1997    Uterine fibroid 11/2004    uterine fibroids    Uterine myoma 2/2005    prominent myomas noted in the uterus      Social History:  Social History     Socioeconomic History    Marital status:    Tobacco Use    Smoking status: Never    Smokeless tobacco: Never   Vaping Use    Vaping Use: Never used   Substance and Sexual Activity    Alcohol use: Yes     Alcohol/week: 0.0 standard drinks of alcohol     Comment: socially    Drug use: No   Other Topics Concern    Caffeine Concern No    Exercise Yes     Comment: 4 times per week       Past Surgical History:   Procedure Laterality Date    COLONOSCOPY  6/21/2006    to cecum    ELECTROCARDIOGRAM, COMPLETE  6/21/2005    HYSTERECTOMY  2004    total hystero. TORY BIOPSY STEREO NODULE 1 SITE RIGHT (CPT=19081) Right 1995    benign    OOPHORECTOMY Bilateral 2004    total hystero. OTHER SURGICAL HISTORY  2010    achilles heel    OTHER SURGICAL HISTORY  1/30/2006    shave bx, skin mole L forearm         REVIEW OF SYSTEMS:   GENERAL HEALTH: feels well otherwise  GENERAL : denies fever, chills, sweats, weight loss, weight gain  SKIN: denies any unusual skin lesions or rashes  RESPIRATORY: denies shortness of breath with exertion  NEURO: denies headaches    EXAM:   There were no vitals taken for this visit. System Findings Details   Skin Normal Inspection - Normal.   Constitutional Normal Overall appearance - Normal.   Head/Face Normal Facial features - Normal. Eyebrows - Normal. Skull - Normal.   Oral/Oropharynx Normal Lips - Normal, Tonsils - Normal, Tongue - Normal    Nasal Normal External nose - Normal. Nasal septum - Normal, Turbinates - Normal   Neurological Normal Memory - Normal. Cranial nerves - Cranial nerves II through XII grossly intact. Neck Exam Normal Inspection - Normal. Palpation - Normal. Parotid gland - Normal. Thyroid gland - Normal.   Psychiatric Normal Orientation - Oriented to time, place, person & situation. Appropriate mood and affect. Lymph Detail Normal Submental. Submandibular. Anterior cervical. Posterior cervical. Supraclavicular.    Eyes Normal Conjunctiva - Right: Normal, Left: Normal. Pupil - Right: Normal, Left: Normal.    Ears Normal Inspection - Right: Normal, Left: Normal. Canal - Left: Normal. TM - Right: Normal, Left: Normal.     Patients exam showed wax impaction right ear, wax impaction left ear. Ear wax was removed under the operative microscope. No complications. Patient tolerated the procedure well. Ear exam findings listed in note after removal.    ASSESSMENT AND PLAN:   1. Bilateral impacted cerumen  Problem is resolved. She will return in 3 months for recheck. The patient indicates understanding of these issues and agrees to the plan.       Moshe Castillo MD  10/16/2023  9:13 AM

## 2023-11-06 ENCOUNTER — OFFICE VISIT (OUTPATIENT)
Facility: LOCATION | Age: 68
End: 2023-11-06
Payer: COMMERCIAL

## 2023-11-06 VITALS — HEART RATE: 80 BPM | TEMPERATURE: 97 F

## 2023-11-06 DIAGNOSIS — I83.819 VARICOSE VEINS WITH PAIN: Primary | ICD-10-CM

## 2023-11-06 PROCEDURE — 99213 OFFICE O/P EST LOW 20 MIN: CPT | Performed by: SURGERY

## 2023-11-14 NOTE — PATIENT INSTRUCTIONS
Understanding Binge Eating Disorder  Maybe you eat too much on weekends. Or you always order the jumbo tub of popcorn at the movies. Maybe you’re hooked on chocolate chip cookies. But that doesn’t mean you have binge eating disorder.  For those who do, sade National Association of Anorexia Nervosa and Associated Disorders  www. [place]   [phone]     National Eating Disorders Association  www.nationaleatingdisorders. [place]   [phone]   Date Last Reviewed: 1/1/2017  © 0620-5700 [employer]. If nonsurgical treatments don’t ease your symptoms, surgery may be recommended. A tendon is a cordlike fiber that attaches muscle to bone and allows joints to bend. The tendon is surrounded by a protective cover called a sheath.  During surgery, the sheath 61

## 2023-11-16 ENCOUNTER — PATIENT MESSAGE (OUTPATIENT)
Facility: LOCATION | Age: 68
End: 2023-11-16

## 2023-11-16 NOTE — TELEPHONE ENCOUNTER
Contacted [facility] FEP regarding preauth for varicose vein injections bilaterally. Mir Tamayo from [place] stated no preauthorization, prededtermination or medical review is necessary for CPT code 47926 bilaterally. Contacted patient by [employer] and informed her of the above and stated she can call office and make appointment.

## 2023-11-20 ENCOUNTER — LAB ENCOUNTER (OUTPATIENT)
Dept: LAB | Age: 68
End: 2023-11-20
Attending: INTERNAL MEDICINE
Payer: COMMERCIAL

## 2023-11-20 ENCOUNTER — TELEPHONE (OUTPATIENT)
Dept: INTERNAL MEDICINE CLINIC | Facility: CLINIC | Age: 68
End: 2023-11-20

## 2023-11-20 ENCOUNTER — ORDER TRANSCRIPTION (OUTPATIENT)
Dept: ADMINISTRATIVE | Facility: HOSPITAL | Age: 68
End: 2023-11-20

## 2023-11-20 DIAGNOSIS — Z11.52 ENCOUNTER FOR SCREENING LABORATORY TESTING FOR COVID-19 VIRUS: ICD-10-CM

## 2023-11-20 DIAGNOSIS — Z11.52 ENCOUNTER FOR SCREENING LABORATORY TESTING FOR COVID-19 VIRUS: Primary | ICD-10-CM

## 2023-11-20 PROCEDURE — 87635 SARS-COV-2 COVID-19 AMP PRB: CPT | Performed by: INTERNAL MEDICINE

## 2023-11-20 NOTE — TELEPHONE ENCOUNTER
PT HAS COVID & NEEDS ORDER FOR PCR COVID TEST TO BE PLACED.   NEEDS COVID TEST TO PROVE SHE IN FACT HAS IT & CAN SHOW AIRLINES RESULTS TO GET TRAVEL REFUND.

## 2023-11-21 ENCOUNTER — TELEMEDICINE (OUTPATIENT)
Dept: INTERNAL MEDICINE CLINIC | Facility: CLINIC | Age: 68
End: 2023-11-21
Payer: COMMERCIAL

## 2023-11-21 DIAGNOSIS — U07.1 COVID-19: Primary | ICD-10-CM

## 2023-11-21 LAB — SARS-COV-2 RNA RESP QL NAA+PROBE: DETECTED

## 2023-11-21 PROCEDURE — 99213 OFFICE O/P EST LOW 20 MIN: CPT | Performed by: PHYSICIAN ASSISTANT

## 2023-11-21 NOTE — PROGRESS NOTES
Dereck Natarajan is a 70year old female. HPI:   This visit is conducted using Telemedicine with live, interactive video and audio. Patient consents to video visit today to discuss pt tested + for COVID 3 days ago, on home test. Symptoms began 3 days ago. Was supposed to be flying to [place] in 3 days and not well enough to travel  Still waiting on PCR test result   Feeling fatigued, coughing, congested, denies CP, SOB    Current Outpatient Medications   Medication Sig Dispense Refill    methylPREDNISolone (MEDROL) 4 MG Oral Tablet Therapy Pack Dosepack: take as directed 1 each 0    Hydrocod Yahir-Chlorphe Yahir ER 10-8 MG/5ML Oral Suspension Extended Release Take 5 mL by mouth 2 (two) times daily as needed. 473 mL 0    Hydrocod Yahir-Chlorphe Yahir ER 10-8 MG/5ML Oral Suspension Extended Release Take 5 mL by mouth 2 (two) times daily as needed. (Patient not taking: Reported on 5/22/2023)      Cholecalciferol (VITAMIN D3 GUMMIES ADULT OR) Take 3,000 Units by mouth. LEVOXYL 137 MCG Oral Tab TAKE 1 TABLET DAILY 90 tablet 2    Flaxseed Oil (LINSEED OIL) Does not apply Oil       Multiple Vitamins-Minerals (WOMENS MULTIVITAMIN PLUS) Oral Tab Take by mouth. Calcium Carbonate-Vitamin D 600-200 MG-UNIT Oral Tab Take  by mouth 2 (two) times daily. carbamazepine (TEGRETOL) 200 MG Oral Tab Take 2 tablets (400 mg total) by mouth 2 (two) times daily.  Taking 800 daily per pt  2      Past Medical History:   Diagnosis Date    Acute bronchitis 2013    Binge eating disorder 8/24/2018    Diverticulosis 6/2006    mild    Exostosis 2010    H/O bone density study 11/2012    DEXA scan    Hypothyroidism 11/10/2014    Internal hemorrhoids 6/2006    2+    Multiple thyroid nodules 7/2010    2 nodules    Ovarian cyst 2/2005    complex cystic mass Rt adnexa, simple cyst in Lt adnexa    Plantar fascial fibromatosis 2010    Seizure disorder ([address] 11/10/2014    last seizure was in 1997    Uterine fibroid 11/2004 uterine fibroids    Uterine myoma 2/2005    prominent myomas noted in the uterus      Social History:  Social History     Socioeconomic History    Marital status:    Tobacco Use    Smoking status: Never    Smokeless tobacco: Never   Vaping Use    Vaping Use: Never used   Substance and Sexual Activity    Alcohol use: Yes     Alcohol/week: 0.0 standard drinks of alcohol     Comment: socially    Drug use: No   Other Topics Concern    Caffeine Concern No    Exercise Yes     Comment: 4 times per week         REVIEW OF SYSTEMS:   GENERAL HEALTH: feels well otherwise. Denies fever, chills, unintentional weight change  SKIN: denies any unusual skin lesions or rashes  RESPIRATORY: denies hemoptysis, shortness of breath with exertion, wheezing or cough  CARDIOVASCULAR: denies chest pain or palpitations, denies leg swelling  GI: denies abdominal pain and denies heartburn. Denies nausea, vomiting, diarrhea, constipation  NEURO: denies headaches, dizziness, weakness, syncope    EXAM:   No vitals for video visit  Physical Exam  - well appearing via video visit  - no visible rash or diaphoresis  - no respiratory distress or cough observed  - able to speak in full sentences  - alert and oriented        ASSESSMENT AND PLAN:   1. COVID-19 (Primary)   + on home test, moderate symptoms pt advised on rest, fluids, monitor for new or worsening symptoms. The patient indicates understanding of these issues and agrees to the plan. Return if symptoms worsen or fail to improve.

## 2023-11-24 ENCOUNTER — TELEPHONE (OUTPATIENT)
Dept: INTERNAL MEDICINE CLINIC | Facility: CLINIC | Age: 68
End: 2023-11-24

## 2023-11-24 NOTE — TELEPHONE ENCOUNTER
Pt called with COVID update. She is still feeling fatigued, congested, cough. Pt advised not to travel, continue rest and supportive care. Call with new/worsening symptoms. Pt expresses understanding and agrees to the plan.

## 2023-12-18 ENCOUNTER — OFFICE VISIT (OUTPATIENT)
Facility: LOCATION | Age: 68
End: 2023-12-18
Payer: COMMERCIAL

## 2023-12-18 VITALS — TEMPERATURE: 98 F | HEART RATE: 79 BPM

## 2023-12-18 DIAGNOSIS — I83.819 VARICOSE VEINS WITH PAIN: Primary | ICD-10-CM

## 2023-12-18 NOTE — PROCEDURES
Pre op diagnosis: Varicose veins with pain, right anterior leg    Post op diagnosis: SVaricose veins with pain, right anterior leg    Procedure: Sclerotherapy of varicose veins, right anterior leg    Surgeon: Jose Carcamo MD    History of present illness: This patient was found to have symptomatic varicose veins on clinical exam. The varicose veins are determined to be amenable to sclerotherapy treatment. The patient presents at this time for sclerotherapy. Operative findings:   The patient had sclerotherapy injections into multiple veins including spider veins, reticular veins, and medium size varicosities. 4 cc of sclerosant was used. Operative Summary:   Each location was treated in the same fashion. The site was prepped with alcohol. The vein was cannulated and was then injected with a sclerotherapy agent. Great care was taken to avoid extravasation of fluid from the veins. The procedure was performed repeatedly with several different sites injected by the sclerotherapy agent. Sterile dressings were placed at the puncture sites after the procedure. The patient was informed to place the compression stockings immediately after the procedure was completed. The patient remained in stable condition after the procedure and was observed in our office after the procedure for an appropriate period of time.     Estimated Blood Loss: none     Jose Carcamo MD

## 2023-12-28 ENCOUNTER — HOSPITAL ENCOUNTER (OUTPATIENT)
Dept: MAMMOGRAPHY | Age: 68
Discharge: HOME | End: 2023-12-28
Attending: OBSTETRICS & GYNECOLOGY
Payer: COMMERCIAL

## 2023-12-28 DIAGNOSIS — Z12.31 ENCOUNTER FOR SCREENING MAMMOGRAM FOR MALIGNANT NEOPLASM OF BREAST: ICD-10-CM

## 2023-12-28 PROCEDURE — 77063 BREAST TOMOSYNTHESIS BI: CPT | Performed by: OBSTETRICS & GYNECOLOGY

## 2023-12-28 PROCEDURE — 77067 SCR MAMMO BI INCL CAD: CPT | Performed by: OBSTETRICS & GYNECOLOGY

## 2024-01-15 ENCOUNTER — OFFICE VISIT (OUTPATIENT)
Facility: LOCATION | Age: 69
End: 2024-01-15
Payer: COMMERCIAL

## 2024-01-15 DIAGNOSIS — H61.23 BILATERAL IMPACTED CERUMEN: Primary | ICD-10-CM

## 2024-01-15 PROCEDURE — 99213 OFFICE O/P EST LOW 20 MIN: CPT | Performed by: OTOLARYNGOLOGY

## 2024-01-15 NOTE — PROGRESS NOTES
Sherly Moser is a 68 year old female.   Chief Complaint   Patient presents with    Cerumen Impaction     HPI:   Patient complains of plugging in both ears.  She also felt the right ear more plugged and had some dizziness.  Denies fevers chills.  Current Outpatient Medications   Medication Sig Dispense Refill    methylPREDNISolone (MEDROL) 4 MG Oral Tablet Therapy Pack Dosepack: take as directed 1 each 0    Hydrocod Yahir-Chlorphe Yahir ER 10-8 MG/5ML Oral Suspension Extended Release Take 5 mL by mouth 2 (two) times daily as needed. 473 mL 0    Hydrocod Yahir-Chlorphe Yahir ER 10-8 MG/5ML Oral Suspension Extended Release Take 5 mL by mouth 2 (two) times daily as needed. (Patient not taking: Reported on 5/22/2023)      Cholecalciferol (VITAMIN D3 GUMMIES ADULT OR) Take 3,000 Units by mouth.      LEVOXYL 137 MCG Oral Tab TAKE 1 TABLET DAILY 90 tablet 2    Flaxseed Oil (LINSEED OIL) Does not apply Oil       Multiple Vitamins-Minerals (WOMENS MULTIVITAMIN PLUS) Oral Tab Take by mouth.      Calcium Carbonate-Vitamin D 600-200 MG-UNIT Oral Tab Take  by mouth 2 (two) times daily.      carbamazepine (TEGRETOL) 200 MG Oral Tab Take 2 tablets (400 mg total) by mouth 2 (two) times daily. Taking 800 daily per pt  2      Past Medical History:   Diagnosis Date    Acute bronchitis 2013    Binge eating disorder 8/24/2018    Diverticulosis 6/2006    mild    Exostosis 2010    H/O bone density study 11/2012    DEXA scan    Hypothyroidism 11/10/2014    Internal hemorrhoids 6/2006    2+    Multiple thyroid nodules 7/2010    2 nodules    Ovarian cyst 2/2005    complex cystic mass Rt adnexa, simple cyst in Lt adnexa    Plantar fascial fibromatosis 2010    Seizure disorder (HCC) 11/10/2014    last seizure was in 1997    Uterine fibroid 11/2004    uterine fibroids    Uterine myoma 2/2005    prominent myomas noted in the uterus      Social History:  Social History     Socioeconomic History    Marital status:    Tobacco Use     Smoking status: Never    Smokeless tobacco: Never   Vaping Use    Vaping Use: Never used   Substance and Sexual Activity    Alcohol use: Yes     Alcohol/week: 0.0 standard drinks of alcohol     Comment: socially    Drug use: No   Other Topics Concern    Caffeine Concern No    Exercise Yes     Comment: 4 times per week       Past Surgical History:   Procedure Laterality Date    COLONOSCOPY  6/21/2006    to cecum    ELECTROCARDIOGRAM, COMPLETE  6/21/2005    HYSTERECTOMY  2004    total hystero.    TORY BIOPSY STEREO NODULE 1 SITE RIGHT (CPT=19081) Right 1995    benign    OOPHORECTOMY Bilateral 2004    total hystero.    OTHER SURGICAL HISTORY  2010    achilles heel    OTHER SURGICAL HISTORY  1/30/2006    shave bx, skin mole L forearm         REVIEW OF SYSTEMS:   GENERAL HEALTH: feels well otherwise  GENERAL : denies fever, chills, sweats, weight loss, weight gain  SKIN: denies any unusual skin lesions or rashes  RESPIRATORY: denies shortness of breath with exertion  NEURO: denies headaches    EXAM:   There were no vitals taken for this visit.  System Findings Details   Skin Normal Inspection - Normal.   Constitutional Normal Overall appearance - Normal.   Head/Face Normal Facial features - Normal. Eyebrows - Normal. Skull - Normal.   Oral/Oropharynx Normal Lips - Normal, Tonsils - Normal, Tongue - Normal    Nasal Normal External nose - Normal. Nasal septum - Normal, Turbinates - Normal   Neurological Normal Memory - Normal. Cranial nerves - Cranial nerves II through XII grossly intact.   Neck Exam Normal Inspection - Normal. Palpation - Normal. Parotid gland - Normal. Thyroid gland - Normal.   Psychiatric Normal Orientation - Oriented to time, place, person & situation. Appropriate mood and affect.   Lymph Detail Normal Submental. Submandibular. Anterior cervical. Posterior cervical. Supraclavicular.   Eyes Normal Conjunctiva - Right: Normal, Left: Normal. Pupil - Right: Normal, Left: Normal.    Ears Normal Inspection -  Right: Normal, Left: Normal. Canal - Left: Normal. TM - Right: Normal, Left: Normal.     Patients exam showed wax impaction right ear, wax impaction left ear. Ear wax was removed under the operative microscope. No complications. Patient tolerated the procedure well. Ear exam findings listed in note after removal.    ASSESSMENT AND PLAN:   1. Bilateral impacted cerumen  Problem is resolved.  She will return in 2 to 3 months for recheck.      The patient indicates understanding of these issues and agrees to the plan.      Reed Liz MD  1/15/2024  9:02 AM

## 2024-01-17 ENCOUNTER — OFFICE VISIT (OUTPATIENT)
Facility: LOCATION | Age: 69
End: 2024-01-17
Payer: COMMERCIAL

## 2024-01-17 VITALS — TEMPERATURE: 98 F | HEART RATE: 78 BPM

## 2024-01-17 DIAGNOSIS — I83.819 VARICOSE VEINS WITH PAIN: Primary | ICD-10-CM

## 2024-01-17 PROCEDURE — 36471 NJX SCLRSNT MLT INCMPTNT VN: CPT | Performed by: SURGERY

## 2024-01-17 NOTE — PROGRESS NOTES
Follow Up Visit Note       Active Problems      1. Varicose veins with pain          Chief Complaint   Chief Complaint   Patient presents with    [facility]     EP - 2nd sclerotherapy injection         History of Present Illness  The patient is a 68-year-old female who presents for her second sclerotherapy treatment.  She has had some good results with the first treatment but has some residual varicosities in the right leg.      Allergies  Sherly is allergic to tomatoes.    Past Medical / Surgical / Social / Family History    The past medical and past surgical history have been reviewed by me today.    Past Medical History:   Diagnosis Date    Acute bronchitis 2013    Binge eating disorder 8/24/2018    Diverticulosis 6/2006    mild    Exostosis 2010    H/O bone density study 11/2012    DEXA scan    Hypothyroidism 11/10/2014    Internal hemorrhoids 6/2006    2+    Multiple thyroid nodules 7/2010    2 nodules    Ovarian cyst 2/2005    complex cystic mass Rt adnexa, simple cyst in Lt adnexa    Plantar fascial fibromatosis 2010    Seizure disorder (HCC) 11/10/2014    last seizure was in 1997    Uterine fibroid 11/2004    uterine fibroids    Uterine myoma 2/2005    prominent myomas noted in the uterus     Past Surgical History:   Procedure Laterality Date    COLONOSCOPY  6/21/2006    to cecum    ELECTROCARDIOGRAM, COMPLETE  6/21/2005    HYSTERECTOMY  2004    total hystero.    TORY BIOPSY STEREO NODULE 1 SITE RIGHT (CPT=19081) Right 1995    benign    OOPHORECTOMY Bilateral 2004    total hystero.    OTHER SURGICAL HISTORY  2010    achilles heel    OTHER SURGICAL HISTORY  1/30/2006    shave bx, skin mole L forearm       The family history and social history have been reviewed by me today.    Family History   Problem Relation Age of Onset    Heart Disease Father     Thyroid Disorder Father         \"thyroid\"    Arthritis Mother     Diabetes Mother     Hypertension Mother     Thyroid Disorder Mother         \"thyroid\"     Heart Disease Paternal Grandfather     Heart Disease Paternal Grandmother     Heart Disease Maternal Grandfather     Heart Disease Maternal Grandmother     Other (Other) Other         common variable immunodeficiency     Thyroid Disorder Other         \"thyroid\" - siblings    Other (Other) Other         fam hx iliac artery aneruysm    Thyroid Disorder Daughter      Social History     Socioeconomic History    Marital status:    Tobacco Use    Smoking status: Never    Smokeless tobacco: Never   Vaping Use    Vaping Use: Never used   Substance and Sexual Activity    Alcohol use: Yes     Alcohol/week: 0.0 standard drinks of alcohol     Comment: socially    Drug use: No   Other Topics Concern    Caffeine Concern No    Exercise Yes     Comment: 4 times per week         Current Outpatient Medications:     methylPREDNISolone (MEDROL) 4 MG Oral Tablet Therapy Pack, Dosepack: take as directed, Disp: 1 each, Rfl: 0    Hydrocod Yahir-Chlorphe Yahir ER 10-8 MG/5ML Oral Suspension Extended Release, Take 5 mL by mouth 2 (two) times daily as needed., Disp: 473 mL, Rfl: 0    Hydrocod Yahir-Chlorphe Yahir ER 10-8 MG/5ML Oral Suspension Extended Release, Take 5 mL by mouth 2 (two) times daily as needed. (Patient not taking: Reported on 5/22/2023), Disp: , Rfl:     Cholecalciferol (VITAMIN D3 GUMMIES ADULT OR), Take 3,000 Units by mouth., Disp: , Rfl:     LEVOXYL 137 MCG Oral Tab, TAKE 1 TABLET DAILY, Disp: 90 tablet, Rfl: 2    Flaxseed Oil (LINSEED OIL) Does not apply Oil, , Disp: , Rfl:     Multiple Vitamins-Minerals (WOMENS MULTIVITAMIN PLUS) Oral Tab, Take by mouth., Disp: , Rfl:     Calcium Carbonate-Vitamin D 600-200 MG-UNIT Oral Tab, Take  by mouth 2 (two) times daily., Disp: , Rfl:     carbamazepine (TEGRETOL) 200 MG Oral Tab, Take 2 tablets (400 mg total) by mouth 2 (two) times daily. Taking 800 daily per pt, Disp: , Rfl: 2     Review of Systems  The Review of Systems has been reviewed by me during today.  Review of Systems    Constitutional: Negative.    HENT: Negative.     Eyes: Negative.    Respiratory: Negative.     Cardiovascular: Negative.    Gastrointestinal: Negative.    Genitourinary: Negative.    Musculoskeletal: Negative.    Skin: Negative.    Neurological: Negative.    Psychiatric/Behavioral: Negative.          Physical Findings   Pulse 78   Temp 98.4 °F (36.9 °C) (Temporal)   Physical Exam  Constitutional:       Appearance: She is well-developed.   HENT:      Head: Normocephalic and atraumatic.   Eyes:      General: No scleral icterus.     Conjunctiva/sclera: Conjunctivae normal.   Neck:      Vascular: No JVD.   Skin:     General: Skin is warm and dry.             Comments: Clusters of varicosities extending down the left leg, as marked   Neurological:      Mental Status: She is alert and oriented to person, place, and time.   Psychiatric:         Behavior: Behavior normal.         Thought Content: Thought content normal.         Judgment: Judgment normal.          Assessment   1. Varicose veins with pain        Plan   We will proceed with sclerotherapy.  She will follow-up in 2 weeks for further sclerotherapy.      Opal Ring MD

## 2024-01-18 NOTE — PROCEDURES
Pre op diagnosis: Varicose veins with pain, left leg    Post op diagnosis: Varicose veins with pain, left leg    Procedure: Sclerotherapy of varicose veins, left leg    Surgeon: Opal Ring MD    History of present illness:   This patient was found to have symptomatic varicose veins on clinical exam. The varicose veins are determined to be amenable to sclerotherapy treatment. The patient presents at this time for sclerotherapy.    Operative findings:   The patient had sclerotherapy injections into multiple veins including spider veins, reticular veins, and medium size varicosities.  Veins on the anterior left thigh, lateral proximal left thigh, and anterior left lower leg were injected.      Operative Summary:   Each location was treated in the same fashion.    The site was prepped with alcohol.    The vein was cannulated and was then injected with a sclerotherapy agent.    Great care was taken to avoid extravasation of fluid from the veins.    The procedure was performed repeatedly with several different sites injected by the sclerotherapy agent.    Sterile dressings were placed at the puncture sites after the procedure.    The patient was informed to place the compression stockings immediately after the procedure was completed.    The patient remained in stable condition after the procedure and was observed in our office after the procedure for an appropriate period of time.    Estimated Blood Loss: none     Opal Ring MD

## 2024-01-22 DIAGNOSIS — G40.909 SEIZURE DISORDER (CMD): ICD-10-CM

## 2024-01-24 RX ORDER — CARBAMAZEPINE 200 MG/1
TABLET ORAL
Qty: 360 TABLET | Refills: 0 | Status: SHIPPED | OUTPATIENT
Start: 2024-01-24

## 2024-01-30 ENCOUNTER — TELEPHONE (OUTPATIENT)
Dept: NEUROLOGY | Age: 69
End: 2024-01-30

## 2024-01-30 DIAGNOSIS — G40.909 SEIZURE DISORDER (CMD): Primary | ICD-10-CM

## 2024-01-31 ENCOUNTER — OFFICE VISIT (OUTPATIENT)
Facility: LOCATION | Age: 69
End: 2024-01-31
Payer: COMMERCIAL

## 2024-01-31 ENCOUNTER — LAB SERVICES (OUTPATIENT)
Dept: LAB | Age: 69
End: 2024-01-31

## 2024-01-31 VITALS — HEART RATE: 80 BPM | TEMPERATURE: 98 F

## 2024-01-31 DIAGNOSIS — G40.909 SEIZURE DISORDER (CMD): ICD-10-CM

## 2024-01-31 DIAGNOSIS — I83.811 VARICOSE VEINS OF LEG WITH PAIN, RIGHT: Primary | ICD-10-CM

## 2024-01-31 LAB
ALBUMIN SERPL-MCNC: 3.4 G/DL (ref 3.6–5.1)
ALBUMIN/GLOB SERPL: 1.1 {RATIO} (ref 1–2.4)
ALP SERPL-CCNC: 151 UNITS/L (ref 45–117)
ALT SERPL-CCNC: 21 UNITS/L
ANION GAP SERPL CALC-SCNC: 13 MMOL/L (ref 7–19)
AST SERPL-CCNC: 21 UNITS/L
BASOPHILS # BLD: 0.1 K/MCL (ref 0–0.3)
BASOPHILS NFR BLD: 2 %
BILIRUB SERPL-MCNC: 0.5 MG/DL (ref 0.2–1)
BUN SERPL-MCNC: 13 MG/DL (ref 6–20)
BUN/CREAT SERPL: 21 (ref 7–25)
CALCIUM SERPL-MCNC: 9.3 MG/DL (ref 8.4–10.2)
CARBAMAZEPINE SERPL-MCNC: 10.1 MCG/ML (ref 4–12)
CHLORIDE SERPL-SCNC: 100 MMOL/L (ref 97–110)
CO2 SERPL-SCNC: 29 MMOL/L (ref 21–32)
CREAT SERPL-MCNC: 0.61 MG/DL (ref 0.51–0.95)
DEPRECATED RDW RBC: 40.1 FL (ref 39–50)
EGFRCR SERPLBLD CKD-EPI 2021: >90 ML/MIN/{1.73_M2}
EOSINOPHIL # BLD: 0.2 K/MCL (ref 0–0.5)
EOSINOPHIL NFR BLD: 6 %
ERYTHROCYTE [DISTWIDTH] IN BLOOD: 11.8 % (ref 11–15)
FASTING DURATION TIME PATIENT: 12 HOURS (ref 0–999)
GLOBULIN SER-MCNC: 3.2 G/DL (ref 2–4)
GLUCOSE SERPL-MCNC: 90 MG/DL (ref 70–99)
HCT VFR BLD CALC: 42.2 % (ref 36–46.5)
HGB BLD-MCNC: 14 G/DL (ref 12–15.5)
IMM GRANULOCYTES # BLD AUTO: 0 K/MCL (ref 0–0.2)
IMM GRANULOCYTES # BLD: 0 %
LYMPHOCYTES # BLD: 1.6 K/MCL (ref 1–4)
LYMPHOCYTES NFR BLD: 38 %
MCH RBC QN AUTO: 30.9 PG (ref 26–34)
MCHC RBC AUTO-ENTMCNC: 33.2 G/DL (ref 32–36.5)
MCV RBC AUTO: 93.2 FL (ref 78–100)
MONOCYTES # BLD: 0.5 K/MCL (ref 0.3–0.9)
MONOCYTES NFR BLD: 11 %
NEUTROPHILS # BLD: 1.8 K/MCL (ref 1.8–7.7)
NEUTROPHILS NFR BLD: 43 %
NRBC BLD MANUAL-RTO: 0 /100 WBC
PLATELET # BLD AUTO: 217 K/MCL (ref 140–450)
POTASSIUM SERPL-SCNC: 4.5 MMOL/L (ref 3.4–5.1)
PROT SERPL-MCNC: 6.6 G/DL (ref 6.4–8.2)
RBC # BLD: 4.53 MIL/MCL (ref 4–5.2)
SODIUM SERPL-SCNC: 137 MMOL/L (ref 135–145)
WBC # BLD: 4.2 K/MCL (ref 4.2–11)

## 2024-01-31 PROCEDURE — 80156 ASSAY CARBAMAZEPINE TOTAL: CPT | Performed by: CLINICAL MEDICAL LABORATORY

## 2024-01-31 PROCEDURE — 85025 COMPLETE CBC W/AUTO DIFF WBC: CPT | Performed by: INTERNAL MEDICINE

## 2024-01-31 PROCEDURE — 36471 NJX SCLRSNT MLT INCMPTNT VN: CPT | Performed by: SURGERY

## 2024-01-31 NOTE — PROGRESS NOTES
Follow Up Visit Note       Active Problems      1. Varicose veins of leg with pain, right          Chief Complaint   Chief Complaint   Patient presents with    [facility]     EP - 3rd Sclerotherapy, varicose veins, soreness, some bulging, no other symptoms.         History of Present Illness  The patient presents for her third sclerotherapy treatment.  She has noticed good results with the last 2 treatments.      Allergies  Sherly is allergic to tomatoes.    Past Medical / Surgical / Social / Family History    The past medical and past surgical history have been reviewed by me today.    Past Medical History:   Diagnosis Date    Acute bronchitis 2013    Binge eating disorder 8/24/2018    Diverticulosis 6/2006    mild    Exostosis 2010    H/O bone density study 11/2012    DEXA scan    Hypothyroidism 11/10/2014    Internal hemorrhoids 6/2006    2+    Multiple thyroid nodules 7/2010    2 nodules    Ovarian cyst 2/2005    complex cystic mass Rt adnexa, simple cyst in Lt adnexa    Plantar fascial fibromatosis 2010    Seizure disorder (HCC) 11/10/2014    last seizure was in 1997    Uterine fibroid 11/2004    uterine fibroids    Uterine myoma 2/2005    prominent myomas noted in the uterus     Past Surgical History:   Procedure Laterality Date    COLONOSCOPY  6/21/2006    to cecum    ELECTROCARDIOGRAM, COMPLETE  6/21/2005    HYSTERECTOMY  2004    total hystero.    TORY BIOPSY STEREO NODULE 1 SITE RIGHT (CPT=19081) Right 1995    benign    OOPHORECTOMY Bilateral 2004    total hystero.    OTHER SURGICAL HISTORY  2010    achilles heel    OTHER SURGICAL HISTORY  1/30/2006    shave bx, skin mole L forearm       The family history and social history have been reviewed by me today.    Family History   Problem Relation Age of Onset    Heart Disease Father     Thyroid Disorder Father         \"thyroid\"    Arthritis Mother     Diabetes Mother     Hypertension Mother     Thyroid Disorder Mother         \"thyroid\"    Heart Disease  Paternal Grandfather     Heart Disease Paternal Grandmother     Heart Disease Maternal Grandfather     Heart Disease Maternal Grandmother     Other (Other) Other         common variable immunodeficiency     Thyroid Disorder Other         \"thyroid\" - siblings    Other (Other) Other         fam hx iliac artery aneruysm    Thyroid Disorder Daughter      Social History     Socioeconomic History    Marital status:    Tobacco Use    Smoking status: Never    Smokeless tobacco: Never   Vaping Use    Vaping Use: Never used   Substance and Sexual Activity    Alcohol use: Yes     Alcohol/week: 0.0 standard drinks of alcohol     Comment: socially    Drug use: No   Other Topics Concern    Caffeine Concern No    Exercise Yes     Comment: 4 times per week         Current Outpatient Medications:     methylPREDNISolone (MEDROL) 4 MG Oral Tablet Therapy Pack, Dosepack: take as directed, Disp: 1 each, Rfl: 0    Hydrocod Yahir-Chlorphe Yahir ER 10-8 MG/5ML Oral Suspension Extended Release, Take 5 mL by mouth 2 (two) times daily as needed., Disp: 473 mL, Rfl: 0    Hydrocod Yahir-Chlorphe Yahir ER 10-8 MG/5ML Oral Suspension Extended Release, Take 5 mL by mouth 2 (two) times daily as needed. (Patient not taking: Reported on 5/22/2023), Disp: , Rfl:     Cholecalciferol (VITAMIN D3 GUMMIES ADULT OR), Take 3,000 Units by mouth., Disp: , Rfl:     LEVOXYL 137 MCG Oral Tab, TAKE 1 TABLET DAILY, Disp: 90 tablet, Rfl: 2    Flaxseed Oil (LINSEED OIL) Does not apply Oil, , Disp: , Rfl:     Multiple Vitamins-Minerals (WOMENS MULTIVITAMIN PLUS) Oral Tab, Take by mouth., Disp: , Rfl:     Calcium Carbonate-Vitamin D 600-200 MG-UNIT Oral Tab, Take  by mouth 2 (two) times daily., Disp: , Rfl:     carbamazepine (TEGRETOL) 200 MG Oral Tab, Take 2 tablets (400 mg total) by mouth 2 (two) times daily. Taking 800 daily per pt, Disp: , Rfl: 2     Review of Systems  The Review of Systems has been reviewed by me during today.  Review of Systems    Constitutional: Negative.    HENT: Negative.     Eyes: Negative.    Respiratory: Negative.     Cardiovascular: Negative.    Gastrointestinal: Negative.    Genitourinary: Negative.    Musculoskeletal: Negative.    Skin: Negative.    Neurological: Negative.    Psychiatric/Behavioral: Negative.          Physical Findings   Pulse 80   Temp 97.5 °F (36.4 °C) (Temporal)   Physical Exam  Constitutional:       Appearance: She is well-developed.   HENT:      Head: Normocephalic and atraumatic.   Eyes:      General: No scleral icterus.     Conjunctiva/sclera: Conjunctivae normal.   Neck:      Vascular: No JVD.   Skin:     General: Skin is warm and dry.             Comments: Area of residual varicosities   Neurological:      Mental Status: She is alert and oriented to person, place, and time.   Psychiatric:         Behavior: Behavior normal.         Thought Content: Thought content normal.         Judgment: Judgment normal.          Assessment   1. Varicose veins of leg with pain, right        Plan   We will proceed with her third sclerotherapy treatment.  She will follow-up in 2 weeks for further treatments.      Opal Ring MD

## 2024-01-31 NOTE — PROCEDURES
Pre op diagnosis: Varicose veins with pain, medial right distal thigh and proximal lower leg    Post op diagnosis: Varicose veins with pain, medial right distal thigh and proximal lower leg    Procedure: Sclerotherapy of varicose veins, medial right distal thigh and proximal lower legs    Surgeon: Opal Ring MD    History of present illness:   This patient was found to have symptomatic varicose veins on clinical exam. The varicose veins are determined to be amenable to sclerotherapy treatment. The patient presents at this time for sclerotherapy.    Operative findings:   The patient had sclerotherapy injections into multiple veins including spider veins, reticular veins, and medium size varicosities.  4 cc of sclerosant was used.      Operative Summary:   Each location was treated in the same fashion.    The site was prepped with alcohol.    The vein was cannulated and was then injected with a sclerotherapy agent.    Great care was taken to avoid extravasation of fluid from the veins.    The procedure was performed repeatedly with several different sites injected by the sclerotherapy agent.    Sterile dressings were placed at the puncture sites after the procedure.    The patient was informed to place the compression stockings immediately after the procedure was completed.    The patient remained in stable condition after the procedure and was observed in our office after the procedure for an appropriate period of time.    Estimated Blood Loss: none     Opal Ring MD

## 2024-02-05 ENCOUNTER — HOSPITAL ENCOUNTER (OUTPATIENT)
Dept: MAMMOGRAPHY | Facility: HOSPITAL | Age: 69
Discharge: HOME | End: 2024-02-05
Attending: OBSTETRICS & GYNECOLOGY
Payer: COMMERCIAL

## 2024-02-05 DIAGNOSIS — Z12.39 ENCOUNTER FOR BREAST CANCER SCREENING OTHER THAN MAMMOGRAM: ICD-10-CM

## 2024-02-05 DIAGNOSIS — R92.30 DENSE BREAST: ICD-10-CM

## 2024-02-05 PROCEDURE — 76641 ULTRASOUND BREAST COMPLETE: CPT | Performed by: OBSTETRICS & GYNECOLOGY

## 2024-02-14 ENCOUNTER — TELEPHONE (OUTPATIENT)
Dept: NEUROLOGY | Age: 69
End: 2024-02-14

## 2024-02-14 ENCOUNTER — APPOINTMENT (OUTPATIENT)
Dept: NEUROLOGY | Age: 69
End: 2024-02-14

## 2024-02-14 DIAGNOSIS — G40.909 SEIZURE DISORDER (CMD): Primary | ICD-10-CM

## 2024-02-14 DIAGNOSIS — G40.909 SEIZURE DISORDER (CMD): ICD-10-CM

## 2024-02-14 PROCEDURE — 99213 OFFICE O/P EST LOW 20 MIN: CPT | Performed by: PSYCHIATRY & NEUROLOGY

## 2024-02-16 RX ORDER — CARBAMAZEPINE 200 MG/1
200 TABLET ORAL 2 TIMES DAILY
Qty: 360 TABLET | Refills: 1 | Status: SHIPPED | OUTPATIENT
Start: 2024-02-16 | End: 2025-02-10

## 2024-02-26 ENCOUNTER — OFFICE VISIT (OUTPATIENT)
Facility: LOCATION | Age: 69
End: 2024-02-26
Payer: COMMERCIAL

## 2024-02-26 VITALS — HEART RATE: 78 BPM | TEMPERATURE: 97 F

## 2024-02-26 DIAGNOSIS — I83.813 VARICOSE VEINS OF BOTH LOWER EXTREMITIES WITH PAIN: Primary | ICD-10-CM

## 2024-02-26 NOTE — PROGRESS NOTES
Patient ID: Sherly Moser is a 68 year old female.    Chief Complaint   Patient presents with    [facility]     EP - 4th sclero injection       HPI: Sherly Moser is a 68 year old female presents for follow-up.  Patient is here for her fourth sclerotherapy treatment.  She denies any issues since her last treatment.    Workup: None new      Past Medical History  Past Medical History:   Diagnosis Date    Acute bronchitis 2013    Binge eating disorder 8/24/2018    Diverticulosis 6/2006    mild    Exostosis 2010    H/O bone density study 11/2012    DEXA scan    Hypothyroidism 11/10/2014    Internal hemorrhoids 6/2006    2+    Multiple thyroid nodules 7/2010    2 nodules    Ovarian cyst 2/2005    complex cystic mass Rt adnexa, simple cyst in Lt adnexa    Plantar fascial fibromatosis 2010    Seizure disorder (HCC) 11/10/2014    last seizure was in 1997    Uterine fibroid 11/2004    uterine fibroids    Uterine myoma 2/2005    prominent myomas noted in the uterus       Past Surgical History  Past Surgical History:   Procedure Laterality Date    COLONOSCOPY  6/21/2006    to cecum    ELECTROCARDIOGRAM, COMPLETE  6/21/2005    HYSTERECTOMY  2004    total hystero.    [facility] BIOPSY STEREO NODULE 1 SITE RIGHT (CPT=19081) Right 1995    benign    OOPHORECTOMY Bilateral 2004    total hystero.    OTHER SURGICAL HISTORY  2010    achilles heel    OTHER SURGICAL HISTORY  1/30/2006    shave bx, skin mole L forearm       Medications  Current Outpatient Medications   Medication Sig Dispense Refill    methylPREDNISolone (MEDROL) 4 MG Oral Tablet Therapy Pack Dosepack: take as directed 1 each 0    Hydrocod Yahir-Chlorphe Yahir ER 10-8 MG/5ML Oral Suspension Extended Release Take 5 mL by mouth 2 (two) times daily as needed. 473 mL 0    Hydrocod Yahir-Chlorphe Yahir ER 10-8 MG/5ML Oral Suspension Extended Release Take 5 mL by mouth 2 (two) times daily as needed. (Patient not taking: Reported on 5/22/2023)      Cholecalciferol  (VITAMIN D3 GUMMIES ADULT OR) Take 3,000 Units by mouth.      LEVOXYL 137 MCG Oral Tab TAKE 1 TABLET DAILY 90 tablet 2    Flaxseed Oil (LINSEED OIL) Does not apply Oil       Multiple Vitamins-Minerals (WOMENS MULTIVITAMIN PLUS) Oral Tab Take by mouth.      Calcium Carbonate-Vitamin D 600-200 MG-UNIT Oral Tab Take  by mouth 2 (two) times daily.      carbamazepine (TEGRETOL) 200 MG Oral Tab Take 2 tablets (400 mg total) by mouth 2 (two) times daily. Taking 800 daily per pt  2       Allergies  Allergies   Allergen Reactions    Tomatoes        Social History  History   Smoking Status    Never   Smokeless Tobacco    Never     History   Alcohol Use    0.0 standard drinks of alcohol/week     Comment: socially     History   Drug Use No       Family History  Family History   Problem Relation Age of Onset    Heart Disease Father     Thyroid Disorder Father         \"thyroid\"    Arthritis Mother     Diabetes Mother     Hypertension Mother     Thyroid Disorder Mother         \"thyroid\"    Heart Disease Paternal Grandfather     Heart Disease Paternal Grandmother     Heart Disease Maternal Grandfather     Heart Disease Maternal Grandmother     Other (Other) Other         common variable immunodeficiency     Thyroid Disorder Other         \"thyroid\" - siblings    Other (Other) Other         fam hx iliac artery aneruysm    Thyroid Disorder Daughter        Review of Systems  Review of Systems   Constitutional: Negative.    Respiratory: Negative.     Cardiovascular: Negative.    Gastrointestinal: Negative.        Exam  Vitals:    02/26/24 0857   Pulse: 78   Temp: 97 °F (36.1 °C)     Physical Exam  Constitutional:       Appearance: Normal appearance.   Cardiovascular:      Rate and Rhythm: Normal rate.   Pulmonary:      Effort: Pulmonary effort is normal.   Skin:     General: Skin is warm and dry.             Comments: Area of residual varicosities treated today   Neurological:      Mental Status: She is alert and oriented to person,  place, and time.           Assessment/Plan  Assessment   Problem List Items Addressed This Visit          Cardiac and Vasculature    Varicose veins of both lower extremities with pain - Primary       Sherly Moser is a 68 year old female with varicose veins with pain    Patient has multiple areas of residual varicosities in her bilateral lower extremities  Today, treated the anterior and lateral portion of the left thigh  A total of 3.25 cc of sclerotherapy injected  Areas were dressed with cotton balls and tape  Patient is to wear her compression stockings for today  She can remove the cotton balls and tape later tonight prior to her shower    Will have patient follow-up in 2 to 3 weeks for reevaluation and fifth sclerotherapy treatment    She can call the office for any questions or concerns      Brigid Travis MD  General Surgery  [facility]     CC:  Luca Granados MD

## 2024-03-04 PROBLEM — I83.93 SPIDER VEINS OF BOTH LOWER EXTREMITIES: Status: ACTIVE | Noted: 2024-03-04

## 2024-03-04 PROBLEM — I83.813 VARICOSE VEINS OF BOTH LOWER EXTREMITIES WITH PAIN: Status: ACTIVE | Noted: 2024-03-04

## 2024-03-04 PROBLEM — I83.93 SPIDER VEINS OF BOTH LOWER EXTREMITIES: Status: RESOLVED | Noted: 2024-03-04 | Resolved: 2024-03-04

## 2024-03-04 NOTE — PROCEDURES
OPERATIVE NOTE    Sherly Moser Location: Clinic   [facility] [id number] MRN [id number]   Admission Date (Not on file) Operation Date 3/4/2024   Attending Physician No att. providers found Operating Physician Brigid Travis MD     PREOPERATIVE DIAGNOSIS  Varicose veins with pain, anterior and lateral portion of the left thigh    POSTOPERATIVE DIAGNOSIS  Same    PROCEDURE PERFORMED  Sclerotherapy of varicose veins, anterior lateral portion of the left thigh    SURGEON  Brigid Travis MD    ASSISTANTS  None    ANESTHESIA  None    INDICATIONS  This is a 68 year old female who presents to clinic with symptomatic varicose veins.  On exam, varicose veins were determined to be amenable to sclerotherapy treatment.  Patient presents today for sclerotherapy.    FINDINGS   The patient had sclerotherapy injections into multiple veins including spider veins, reticular veins, and medium size varicosities.  3.25 cc of sclerosant was used.    FINDINGS/DESCRIPTION OF PROCEDURE  After consent, the patient was positioned on the procedure table.  Each location was treated in the same fashion.  The site was prepped with alcohol.  The vein was cannulated, withdrawn to make sure the vein was cannulated, and injected with the sclerotherapy agent.  Great care was taken to avoid extravasation of fluid from the veins into the tissues.  The procedure was performed repeatedly at several different sites.  Dressings were placed at the puncture sites after the procedure, including cotton balls and tape.  Patient was informed to wear compression stockings immediately after the procedure.  The patient remained stable throughout the procedure and recovered in the office for an appropriate period of time.    SPECIMENS REMOVED  None    ESTIMATED BLOOD LOSS  None    COMPLICATIONS  None     Brigid Travis MD

## 2024-03-11 ENCOUNTER — OFFICE VISIT (OUTPATIENT)
Facility: LOCATION | Age: 69
End: 2024-03-11
Payer: COMMERCIAL

## 2024-03-11 VITALS — TEMPERATURE: 97 F | HEART RATE: 74 BPM

## 2024-03-11 DIAGNOSIS — I83.813 VARICOSE VEINS OF BOTH LOWER EXTREMITIES WITH PAIN: Primary | ICD-10-CM

## 2024-03-11 PROCEDURE — 36471 NJX SCLRSNT MLT INCMPTNT VN: CPT | Performed by: STUDENT IN AN ORGANIZED HEALTH CARE EDUCATION/TRAINING PROGRAM

## 2024-03-11 NOTE — PROCEDURES
OPERATIVE NOTE    Sherly Moser Location: [facility] [id number] MRN [id number]   Admission Date (Not on file) Operation Date 3/11/2024   Attending Physician No att. providers found Operating Physician Brigid Travis MD     PREOPERATIVE DIAGNOSIS  Varicose veins with pain, patent thigh and calf    POSTOPERATIVE DIAGNOSIS  Same    PROCEDURE PERFORMED  Sclerotherapy of varicose veins, thigh and calf    SURGEON  Brigid Travis MD    ASSISTANTS  None    ANESTHESIA  None    INDICATIONS  This is a 68 year old femalewho presents to clinic with symptomatic varicose veins.  On exam, varicose veins were determined to be amenable to sclerotherapy treatment.  Patient presents today for sclerotherapy.    FINDINGS   The patient had sclerotherapy injections into multiple veins including spider veins, reticular veins, and medium size varicosities. 4cc of sclerosant was used.    FINDINGS/DESCRIPTION OF PROCEDURE  After consent, the patient was positioned on the procedure table.  Each location was treated in the same fashion.  The site was prepped with alcohol.  The vein was cannulated, withdrawn to make sure the vein was cannulated, and injected with the sclerotherapy agent.  Great care was taken to avoid extravasation of fluid from the veins into the tissues.  The procedure was performed repeatedly at several different sites.  Dressings were placed at the puncture sites after the procedure, including cotton balls and tape.  Patient was informed to wear compression stockings immediately after the procedure.  The patient remained stable throughout the procedure and recovered in the office for an appropriate period of time.    SPECIMENS REMOVED  None    ESTIMATED BLOOD LOSS  None    COMPLICATIONS  None     Brigid Travis MD

## 2024-03-11 NOTE — PROGRESS NOTES
Patient ID: Sherly Moser is a 68 year old female.    Chief Complaint   Patient presents with    [facility]     EP- 5TH SCLERO INJECTIONS       HPI: Sherly Moser is a 68 year old female with scleral injection.  Since last clinic visit, she reports continued pain and numbness on the lateral aspect of her left leg.  She denies any other symptoms.    Workup: None      Past Medical History  Past Medical History:   Diagnosis Date    Acute bronchitis 2013    Binge eating disorder 8/24/2018    Diverticulosis 6/2006    mild    Exostosis 2010    H/O bone density study 11/2012    DEXA scan    Hypothyroidism 11/10/2014    Internal hemorrhoids 6/2006    2+    Multiple thyroid nodules 7/2010    2 nodules    Ovarian cyst 2/2005    complex cystic mass Rt adnexa, simple cyst in Lt adnexa    Plantar fascial fibromatosis 2010    Seizure disorder (HCC) 11/10/2014    last seizure was in 1997    Uterine fibroid 11/2004    uterine fibroids    Uterine myoma 2/2005    prominent myomas noted in the uterus       Past Surgical History  Past Surgical History:   Procedure Laterality Date    COLONOSCOPY  6/21/2006    to cecum    ELECTROCARDIOGRAM, COMPLETE  6/21/2005    HYSTERECTOMY  2004    total hystero.    TORY BIOPSY STEREO NODULE 1 SITE RIGHT (CPT=19081) Right 1995    benign    OOPHORECTOMY Bilateral 2004    total hystero.    OTHER SURGICAL HISTORY  2010    achilles heel    OTHER SURGICAL HISTORY  1/30/2006    shave bx, skin mole L forearm       Medications  Current Outpatient Medications   Medication Sig Dispense Refill    methylPREDNISolone (MEDROL) 4 MG Oral Tablet Therapy Pack Dosepack: take as directed 1 each 0    Hydrocod Yahir-Chlorphe Yahir ER 10-8 MG/5ML Oral Suspension Extended Release Take 5 mL by mouth 2 (two) times daily as needed. 473 mL 0    Hydrocod Yahir-Chlorphe Yahir ER 10-8 MG/5ML Oral Suspension Extended Release Take 5 mL by mouth 2 (two) times daily as needed. (Patient not taking: Reported on  5/22/2023)      Cholecalciferol (VITAMIN D3 GUMMIES ADULT OR) Take 3,000 Units by mouth.      LEVOXYL 137 MCG Oral Tab TAKE 1 TABLET DAILY 90 tablet 2    Flaxseed Oil (LINSEED OIL) Does not apply Oil       Multiple Vitamins-Minerals (WOMENS MULTIVITAMIN PLUS) Oral Tab Take by mouth.      Calcium Carbonate-Vitamin D 600-200 MG-UNIT Oral Tab Take  by mouth 2 (two) times daily.      carbamazepine (TEGRETOL) 200 MG Oral Tab Take 2 tablets (400 mg total) by mouth 2 (two) times daily. Taking 800 daily per pt  2       Allergies  Allergies   Allergen Reactions    Tomatoes        Social History  History   Smoking Status    Never   Smokeless Tobacco    Never     History   Alcohol Use    0.0 standard drinks of alcohol/week     Comment: socially     History   Drug Use No       Family History  Family History   Problem Relation Age of Onset    Heart Disease Father     Thyroid Disorder Father         \"thyroid\"    Arthritis Mother     Diabetes Mother     Hypertension Mother     Thyroid Disorder Mother         \"thyroid\"    Heart Disease Paternal Grandfather     Heart Disease Paternal Grandmother     Heart Disease Maternal Grandfather     Heart Disease Maternal Grandmother     Other (Other) Other         common variable immunodeficiency     Thyroid Disorder Other         \"thyroid\" - siblings    Other (Other) Other         fam hx iliac artery aneruysm    Thyroid Disorder Daughter        Review of Systems  Review of Systems   Constitutional: Negative.    Respiratory: Negative.     Cardiovascular: Negative.    Gastrointestinal: Negative.        Exam  Vitals:    03/11/24 0937   Pulse: 74   Temp: 97 °F (36.1 °C)     Physical Exam  Constitutional:       Appearance: Normal appearance.   Cardiovascular:      Rate and Rhythm: Normal rate.   Pulmonary:      Effort: Pulmonary effort is normal.   Skin:     General: Skin is warm and dry.   Neurological:      Mental Status: She is alert and oriented to person, place, and time.            Assessment/Plan  Assessment   Problem List Items Addressed This Visit          Cardiac and Vasculature    Varicose veins of both lower extremities with pain - Primary       Sherly Moser is a 68 year old female varicose veins of the lower extremity with pain    Patient underwent sclerotherapy of the calf, see separate procedure note for details  Patient is to continue wearing her compression stockings for  Will have patient follow-up in 2-3 weeks for 6 injection and reevaluation    She can call the office for any questions or concerns      Brigid Travis MD  General Surgery  [facility]     CC:  Luca Granados MD

## 2024-04-02 ENCOUNTER — OFFICE VISIT (OUTPATIENT)
Facility: LOCATION | Age: 69
End: 2024-04-02
Payer: COMMERCIAL

## 2024-04-02 VITALS — HEART RATE: 79 BPM | TEMPERATURE: 97 F

## 2024-04-02 DIAGNOSIS — I83.813 VARICOSE VEINS OF BOTH LOWER EXTREMITIES WITH PAIN: Primary | ICD-10-CM

## 2024-04-02 PROCEDURE — 36471 NJX SCLRSNT MLT INCMPTNT VN: CPT | Performed by: STUDENT IN AN ORGANIZED HEALTH CARE EDUCATION/TRAINING PROGRAM

## 2024-04-02 NOTE — PROGRESS NOTES
Patient ID: Sherly Moser is a 68 year old female.    Chief Complaint   Patient presents with    [facility]     EP - 6TH Sclero Injection, pain inside on the back of the right thigh/leg area, no other symptoms.       HPI: Sherly Moser is a 68 year old female presents for sclerotherapy injection.  She denies any issues since last visit.    Workup: None    Past Medical History  Past Medical History:    Acute bronchitis    Binge eating disorder    Diverticulosis    mild    Exostosis    H/O bone density study    DEXA scan    Hypothyroidism    Internal hemorrhoids    2+    Multiple thyroid nodules    2 nodules    Ovarian cyst    complex cystic mass Rt adnexa, simple cyst in Lt adnexa    Plantar fascial fibromatosis    Seizure disorder (HCC)    last seizure was in 1997    Uterine fibroid    uterine fibroids    Uterine myoma    prominent myomas noted in the uterus       Past Surgical History  Past Surgical History:   Procedure Laterality Date    Colonoscopy  6/21/2006    to cecum    Electrocardiogram, complete  6/21/2005    Hysterectomy  2004    total hystero.    Maribel biopsy stereo nodule 1 site right (cpt=19081) Right 1995    benign    Oophorectomy Bilateral 2004    total hystero.    Other surgical history  2010    achilles heel    Other surgical history  1/30/2006    shave bx, skin mole L forearm       Medications  Current Outpatient Medications   Medication Sig Dispense Refill    methylPREDNISolone (MEDROL) 4 MG Oral Tablet Therapy Pack Dosepack: take as directed 1 each 0    Hydrocod Yahir-Chlorphe Yahir ER 10-8 MG/5ML Oral Suspension Extended Release Take 5 mL by mouth 2 (two) times daily as needed. 473 mL 0    Hydrocod Yahir-Chlorphe Yahir ER 10-8 MG/5ML Oral Suspension Extended Release Take 5 mL by mouth 2 (two) times daily as needed. (Patient not taking: Reported on 5/22/2023)      Cholecalciferol (VITAMIN D3 GUMMIES ADULT OR) Take 3,000 Units by mouth.      LEVOXYL 137 MCG Oral Tab TAKE 1 TABLET  DAILY 90 tablet 2    Flaxseed Oil (LINSEED OIL) Does not apply Oil       Multiple Vitamins-Minerals (WOMENS MULTIVITAMIN PLUS) Oral Tab Take by mouth.      Calcium Carbonate-Vitamin D 600-200 MG-UNIT Oral Tab Take  by mouth 2 (two) times daily.      carbamazepine (TEGRETOL) 200 MG Oral Tab Take 2 tablets (400 mg total) by mouth 2 (two) times daily. Taking 800 daily per pt  2       Allergies  Allergies   Allergen Reactions    Tomatoes        Social History  History   Smoking Status    Never   Smokeless Tobacco    Never     History   Alcohol Use    0.0 standard drinks of alcohol/week     Comment: socially     History   Drug Use No       Family History  Family History   Problem Relation Age of Onset    Heart Disease Father     Thyroid Disorder Father         \"thyroid\"    Arthritis Mother     Diabetes Mother     Hypertension Mother     Thyroid Disorder Mother         \"thyroid\"    Heart Disease Paternal Grandfather     Heart Disease Paternal Grandmother     Heart Disease Maternal Grandfather     Heart Disease Maternal Grandmother     Other (Other) Other         common variable immunodeficiency     Thyroid Disorder Other         \"thyroid\" - siblings    Other (Other) Other         fam hx iliac artery aneruysm    Thyroid Disorder Daughter        Review of Systems  Review of Systems   Constitutional: Negative.    Respiratory: Negative.     Cardiovascular: Negative.    Gastrointestinal: Negative.        Exam  Vitals:    04/02/24 1104   Pulse: 79   Temp: 96.8 °F (36 °C)     Physical Exam  Constitutional:       Appearance: Normal appearance.   Cardiovascular:      Rate and Rhythm: Normal rate.   Pulmonary:      Effort: Pulmonary effort is normal.   Skin:     General: Skin is warm and dry.          Neurological:      Mental Status: She is alert and oriented to person, place, and time.           Assessment/Plan  Assessment   Problem List Items Addressed This Visit          Cardiac and Vasculature    Varicose veins of both lower  extremities with pain - Primary       Sherly Moser is a 68 year old female with varicose veins of her bilateral lower extremity with pain    Patient underwent 6 sclerotherapy injection, see separate procedure note  Will have patient follow-up in 1 month for reevaluation    She is to call the office with any questions or concerns      Brigid Travis MD  General Surgery  [facility]     CC:  Luca Granados MD

## 2024-04-08 ENCOUNTER — OFFICE VISIT (OUTPATIENT)
Facility: LOCATION | Age: 69
End: 2024-04-08
Payer: COMMERCIAL

## 2024-04-08 DIAGNOSIS — H61.23 BILATERAL IMPACTED CERUMEN: Primary | ICD-10-CM

## 2024-04-08 PROCEDURE — 99213 OFFICE O/P EST LOW 20 MIN: CPT | Performed by: OTOLARYNGOLOGY

## 2024-04-08 NOTE — PROGRESS NOTES
Sherly Moser is a 68 year old female.   Chief Complaint   Patient presents with    Cerumen Impaction     HPI:   Planes of plugging of both ears.  Denies fevers chills nausea vomiting or other constitutional complaints.  Current Outpatient Medications   Medication Sig Dispense Refill    methylPREDNISolone (MEDROL) 4 MG Oral Tablet Therapy Pack Dosepack: take as directed 1 each 0    Hydrocod Yahir-Chlorphe Yahir ER 10-8 MG/5ML Oral Suspension Extended Release Take 5 mL by mouth 2 (two) times daily as needed. 473 mL 0    Hydrocod Yahir-Chlorphe Yahir ER 10-8 MG/5ML Oral Suspension Extended Release Take 5 mL by mouth 2 (two) times daily as needed. (Patient not taking: Reported on 5/22/2023)      Cholecalciferol (VITAMIN D3 GUMMIES ADULT OR) Take 3,000 Units by mouth.      LEVOXYL 137 MCG Oral Tab TAKE 1 TABLET DAILY 90 tablet 2    Flaxseed Oil (LINSEED OIL) Does not apply Oil       Multiple Vitamins-Minerals (WOMENS MULTIVITAMIN PLUS) Oral Tab Take by mouth.      Calcium Carbonate-Vitamin D 600-200 MG-UNIT Oral Tab Take  by mouth 2 (two) times daily.      carbamazepine (TEGRETOL) 200 MG Oral Tab Take 2 tablets (400 mg total) by mouth 2 (two) times daily. Taking 800 daily per pt  2      Past Medical History:   Diagnosis Date    Acute bronchitis 2013    Binge eating disorder 8/24/2018    Diverticulosis 6/2006    mild    Exostosis 2010    H/O bone density study 11/2012    DEXA scan    Hypothyroidism 11/10/2014    Internal hemorrhoids 6/2006    2+    Multiple thyroid nodules 7/2010    2 nodules    Ovarian cyst 2/2005    complex cystic mass Rt adnexa, simple cyst in Lt adnexa    Plantar fascial fibromatosis 2010    Seizure disorder (HCC) 11/10/2014    last seizure was in 1997    Uterine fibroid 11/2004    uterine fibroids    Uterine myoma 2/2005    prominent myomas noted in the uterus      Social History:  Social History     Socioeconomic History    Marital status:    Tobacco Use    Smoking status: Never     Smokeless tobacco: Never   Vaping Use    Vaping Use: Never used   Substance and Sexual Activity    Alcohol use: Yes     Alcohol/week: 0.0 standard drinks of alcohol     Comment: socially    Drug use: No   Other Topics Concern    Caffeine Concern No    Exercise Yes     Comment: 4 times per week       Past Surgical History:   Procedure Laterality Date    COLONOSCOPY  6/21/2006    to cecum    ELECTROCARDIOGRAM, COMPLETE  6/21/2005    HYSTERECTOMY  2004    total hystero.    TORY BIOPSY STEREO NODULE 1 SITE RIGHT (CPT=19081) Right 1995    benign    OOPHORECTOMY Bilateral 2004    total hystero.    OTHER SURGICAL HISTORY  2010    achilles heel    OTHER SURGICAL HISTORY  1/30/2006    shave bx, skin mole L forearm         REVIEW OF SYSTEMS:   GENERAL HEALTH: feels well otherwise  GENERAL : denies fever, chills, sweats, weight loss, weight gain  SKIN: denies any unusual skin lesions or rashes  RESPIRATORY: denies shortness of breath with exertion  NEURO: denies headaches    EXAM:   There were no vitals taken for this visit.  System Findings Details   Skin Normal Inspection - Normal.   Constitutional Normal Overall appearance - Normal.   Head/Face Normal Facial features - Normal. Eyebrows - Normal. Skull - Normal.   Oral/Oropharynx Normal Lips - Normal, Tonsils - Normal, Tongue - Normal    Nasal Normal External nose - Normal. Nasal septum - Normal, Turbinates - Normal   Neurological Normal Memory - Normal. Cranial nerves - Cranial nerves II through XII grossly intact.   Neck Exam Normal Inspection - Normal. Palpation - Normal. Parotid gland - Normal. Thyroid gland - Normal.   Psychiatric Normal Orientation - Oriented to time, place, person & situation. Appropriate mood and affect.   Lymph Detail Normal Submental. Submandibular. Anterior cervical. Posterior cervical. Supraclavicular.   Eyes Normal Conjunctiva - Right: Normal, Left: Normal. Pupil - Right: Normal, Left: Normal.    Ears Normal Inspection - Right: Normal, Left:  Normal. Canal - Left: Normal. TM - Right: Normal, Left: Normal.     Patients exam showed wax impaction right ear, wax impaction left ear. Ear wax was removed under the operative microscope. No complications. Patient tolerated the procedure well. Ear exam findings listed in note after removal.    ASSESSMENT AND PLAN:   1. Bilateral impacted cerumen  Problem is resolved.  She will return every 10 to 12 weeks.      The patient indicates understanding of these issues and agrees to the plan.      Reed Liz MD  4/8/2024  10:18 AM

## 2024-04-29 ENCOUNTER — TELEPHONE (OUTPATIENT)
Facility: LOCATION | Age: 69
End: 2024-04-29

## 2024-04-29 NOTE — TELEPHONE ENCOUNTER
The patient recently called stating that  stated that she would let her know before her next upcoming visit that she will contact the patient stating that she is approved for the sclero injections or not.    Call back # [phone]

## 2024-05-06 NOTE — PROCEDURES
OPERATIVE NOTE    Sherly Moser Location: [facility] [id number] MRN [id number]   Admission Date (Not on file) Operation Date 5/5/2024   Attending Physician No att. providers found Operating Physician Brigid Travis MD     PREOPERATIVE DIAGNOSIS  Varicose veins with pain, left posterior thigh and calf    POSTOPERATIVE DIAGNOSIS  Same    PROCEDURE PERFORMED  Sclerotherapy of varicose veins, left posterior thigh and calf    SURGEON  Brigid Travis MD    ASSISTANTS  None    ANESTHESIA  None    INDICATIONS  This is a 68 year old femalewho presents to clinic with symptomatic varicose veins.  On exam, varicose veins were determined to be amenable to sclerotherapy treatment.  Patient presents today for sclerotherapy.    FINDINGS   The patient had sclerotherapy injections into multiple veins including spider veins, reticular veins, and medium size varicosities. 4cc of sclerosant was used.    FINDINGS/DESCRIPTION OF PROCEDURE  After consent, the patient was positioned on the procedure table.  Each location was treated in the same fashion.  The site was prepped with alcohol.  The vein was cannulated, withdrawn to make sure the vein was cannulated, and injected with the sclerotherapy agent.  Great care was taken to avoid extravasation of fluid from the veins into the tissues.  The procedure was performed repeatedly at several different sites.  Dressings were placed at the puncture sites after the procedure, including cotton balls and tape.  Patient was informed to wear compression stockings immediately after the procedure.  The patient remained stable throughout the procedure and recovered in the office for an appropriate period of time.    SPECIMENS REMOVED  None    ESTIMATED BLOOD LOSS  None    COMPLICATIONS  None     Brigid Travis MD

## 2024-05-07 ENCOUNTER — OFFICE VISIT (OUTPATIENT)
Facility: LOCATION | Age: 69
End: 2024-05-07
Payer: COMMERCIAL

## 2024-05-07 VITALS — HEART RATE: 72 BPM | TEMPERATURE: 97 F

## 2024-05-07 DIAGNOSIS — I83.813 VARICOSE VEINS OF BOTH LOWER EXTREMITIES WITH PAIN: Primary | ICD-10-CM

## 2024-05-07 DIAGNOSIS — M79.662 PAIN OF LEFT CALF: ICD-10-CM

## 2024-05-07 PROCEDURE — 99213 OFFICE O/P EST LOW 20 MIN: CPT | Performed by: STUDENT IN AN ORGANIZED HEALTH CARE EDUCATION/TRAINING PROGRAM

## 2024-05-07 NOTE — PROGRESS NOTES
Patient ID: Sherly Moser is a 68 year old female.    Chief Complaint   Patient presents with    [facility]     EP-Patient does not want sclerotherapy injection, was not approved for further injections here to discuss further treatment and some pain she is experienceing est - 1 month follow up from 6th sceloro injections.         HPI: Sherly Moser is a 68 year old female presents to clinic for follow-up.  Since last clinic appointment, patient reports some pain in her left calf and behind her left knee.  She says that the pain shoots from her left knee down to her calf.  She denies any other issues.    Overall, her symptoms have greatly improved.  The pain and heaviness she felt prior to treatment has improved.  She feels that she can walk 3 miles with her .  She also reports improvement of the cosmetic portion of the veins.    Workup: None    Past Medical History  Past Medical History:    Acute bronchitis    Binge eating disorder    Diverticulosis    mild    Exostosis    H/O bone density study    DEXA scan    Hypothyroidism    Internal hemorrhoids    2+    Multiple thyroid nodules    2 nodules    Ovarian cyst    complex cystic mass Rt adnexa, simple cyst in Lt adnexa    Plantar fascial fibromatosis    Seizure disorder (HCC)    last seizure was in 1997    Uterine fibroid    uterine fibroids    Uterine myoma    prominent myomas noted in the uterus       Past Surgical History  Past Surgical History:   Procedure Laterality Date    Colonoscopy  6/21/2006    to cecum    Electrocardiogram, complete  6/21/2005    Hysterectomy  2004    total hystero.    Maribel biopsy stereo nodule 1 site right (cpt=19081) Right 1995    benign    Oophorectomy Bilateral 2004    total hystero.    Other surgical history  2010    achilles heel    Other surgical history  1/30/2006    shave bx, skin mole L forearm       Medications  Current Outpatient Medications   Medication Sig Dispense Refill    methylPREDNISolone  (MEDROL) 4 MG Oral Tablet Therapy Pack Dosepack: take as directed 1 each 0    Hydrocod Yahir-Chlorphe Yahir ER 10-8 MG/5ML Oral Suspension Extended Release Take 5 mL by mouth 2 (two) times daily as needed. 473 mL 0    Hydrocod Yahir-Chlorphe Yahir ER 10-8 MG/5ML Oral Suspension Extended Release Take 5 mL by mouth 2 (two) times daily as needed. (Patient not taking: Reported on 5/22/2023)      Cholecalciferol (VITAMIN D3 GUMMIES ADULT OR) Take 3,000 Units by mouth.      LEVOXYL 137 MCG Oral Tab TAKE 1 TABLET DAILY 90 tablet 2    Flaxseed Oil (LINSEED OIL) Does not apply Oil       Multiple Vitamins-Minerals (WOMENS MULTIVITAMIN PLUS) Oral Tab Take by mouth.      Calcium Carbonate-Vitamin D 600-200 MG-UNIT Oral Tab Take  by mouth 2 (two) times daily.      carbamazepine (TEGRETOL) 200 MG Oral Tab Take 2 tablets (400 mg total) by mouth 2 (two) times daily. Taking 800 daily per pt  2       Allergies  Allergies   Allergen Reactions    Tomatoes        Social History  History   Smoking Status    Never   Smokeless Tobacco    Never     History   Alcohol Use    0.0 standard drinks of alcohol/week     Comment: socially     History   Drug Use No       Family History  Family History   Problem Relation Age of Onset    Heart Disease Father     Thyroid Disorder Father         \"thyroid\"    Arthritis Mother     Diabetes Mother     Hypertension Mother     Thyroid Disorder Mother         \"thyroid\"    Heart Disease Paternal Grandfather     Heart Disease Paternal Grandmother     Heart Disease Maternal Grandfather     Heart Disease Maternal Grandmother     Other (Other) Other         common variable immunodeficiency     Thyroid Disorder Other         \"thyroid\" - siblings    Other (Other) Other         fam hx iliac artery aneruysm    Thyroid Disorder Daughter        Review of Systems  Review of Systems   Constitutional: Negative.    Respiratory: Negative.     Cardiovascular: Negative.    Gastrointestinal: Negative.        Exam  Vitals:     05/07/24 1126   Pulse: 72   Temp: 96.8 °F (36 °C)     Physical Exam  Constitutional:       Appearance: Normal appearance.   Cardiovascular:      Rate and Rhythm: Normal rate.   Pulmonary:      Effort: Pulmonary effort is normal.   Skin:     General: Skin is warm and dry.   Neurological:      Mental Status: She is alert and oriented to person, place, and time.                                         Assessment/Plan  Assessment   Problem List Items Addressed This Visit          Cardiac and Vasculature    Varicose veins of both lower extremities with pain - Primary       Sherly Moser is a 68 year old female with varicose veins of bilateral lower extremity with pain    Overall, patient's symptoms have improved  She wishes for more sclerotherapy injections to help with the smaller veins  At this time, I recommend patient follow-up with an outside vein center for further intervention  She should continue wearing her compression socks as needed, recommend wearing them when she is walking and sitting for long periods    Patient did report some pain at the last injection site  Will obtain ultrasound to rule out thrombophlebitis  Recommend patient treat the area with ice and ibuprofen as needed for pain  Will have patient follow-up in 1 month for reevaluation    She can call the office for any questions or concerns      Brigid Travis MD  General Surgery  [facility]     CC:  Luca Granados MD

## 2024-06-18 ENCOUNTER — OFFICE VISIT (OUTPATIENT)
Facility: LOCATION | Age: 69
End: 2024-06-18

## 2024-06-18 DIAGNOSIS — H61.23 BILATERAL IMPACTED CERUMEN: Primary | ICD-10-CM

## 2024-06-18 PROCEDURE — 99213 OFFICE O/P EST LOW 20 MIN: CPT | Performed by: OTOLARYNGOLOGY

## 2024-06-18 RX ORDER — MECLIZINE HYDROCHLORIDE 25 MG/1
25 TABLET ORAL 3 TIMES DAILY PRN
Qty: 90 TABLET | Refills: 0 | Status: SHIPPED | OUTPATIENT
Start: 2024-06-18

## 2024-06-18 NOTE — PROGRESS NOTES
Sherly Moser is a 68 year old female. No chief complaint on file.    HPI:   Patient is complaining of plugging in both ears.  She is traveling to [place] next week.  Denies fevers or chills.  She is also looking for a refill on meclizine that has worked in the past.  Current Outpatient Medications   Medication Sig Dispense Refill    meclizine 25 MG Oral Tab Take 1 tablet (25 mg total) by mouth 3 (three) times daily as needed. 90 tablet 0    methylPREDNISolone (MEDROL) 4 MG Oral Tablet Therapy Pack Dosepack: take as directed 1 each 0    Hydrocod Yahir-Chlorphe Yahir ER 10-8 MG/5ML Oral Suspension Extended Release Take 5 mL by mouth 2 (two) times daily as needed. 473 mL 0    Hydrocod Yahir-Chlorphe Yahir ER 10-8 MG/5ML Oral Suspension Extended Release Take 5 mL by mouth 2 (two) times daily as needed. (Patient not taking: Reported on 5/22/2023)      Cholecalciferol (VITAMIN D3 GUMMIES ADULT OR) Take 3,000 Units by mouth.      LEVOXYL 137 MCG Oral Tab TAKE 1 TABLET DAILY 90 tablet 2    Flaxseed Oil (LINSEED OIL) Does not apply Oil       Multiple Vitamins-Minerals (WOMENS MULTIVITAMIN PLUS) Oral Tab Take by mouth.      Calcium Carbonate-Vitamin D 600-200 MG-UNIT Oral Tab Take  by mouth 2 (two) times daily.      carbamazepine (TEGRETOL) 200 MG Oral Tab Take 2 tablets (400 mg total) by mouth 2 (two) times daily. Taking 800 daily per pt  2      Past Medical History:    Acute bronchitis    Binge eating disorder    Diverticulosis    mild    Exostosis    H/O bone density study    DEXA scan    Hypothyroidism    Internal hemorrhoids    2+    Multiple thyroid nodules    2 nodules    Ovarian cyst    complex cystic mass Rt adnexa, simple cyst in Lt adnexa    Plantar fascial fibromatosis    Seizure disorder (HCC)    last seizure was in 1997    Uterine fibroid    uterine fibroids    Uterine myoma    prominent myomas noted in the uterus      Social History:  Social History     Socioeconomic History    Marital status:     Tobacco Use    Smoking status: Never    Smokeless tobacco: Never   Vaping Use    Vaping status: Never Used   Substance and Sexual Activity    Alcohol use: Yes     Alcohol/week: 0.0 standard drinks of alcohol     Comment: socially    Drug use: No   Other Topics Concern    Caffeine Concern No    Exercise Yes     Comment: 4 times per week       Past Surgical History:   Procedure Laterality Date    Colonoscopy  6/21/2006    to cecum    Electrocardiogram, complete  6/21/2005    Hysterectomy  2004    total hystero.    Maribel biopsy stereo nodule 1 site right (cpt=19081) Right 1995    benign    Oophorectomy Bilateral 2004    total hystero.    Other surgical history  2010    achilles heel    Other surgical history  1/30/2006    shave bx, skin mole L forearm         REVIEW OF SYSTEMS:   GENERAL HEALTH: feels well otherwise  GENERAL : denies fever, chills, sweats, weight loss, weight gain  SKIN: denies any unusual skin lesions or rashes  RESPIRATORY: denies shortness of breath with exertion  NEURO: denies headaches    EXAM:   There were no vitals taken for this visit.  System Findings Details   Skin Normal Inspection - Normal.   Constitutional Normal Overall appearance - Normal.   Head/Face Normal Facial features - Normal. Eyebrows - Normal. Skull - Normal.   Oral/Oropharynx Normal Lips - Normal, Tonsils - Normal, Tongue - Normal    Nasal Normal External nose - Normal. Nasal septum - Normal, Turbinates - Normal   Neurological Normal Memory - Normal. Cranial nerves - Cranial nerves II through XII grossly intact.   Neck Exam Normal Inspection - Normal. Palpation - Normal. Parotid gland - Normal. Thyroid gland - Normal.   Psychiatric Normal Orientation - Oriented to time, place, person & situation. Appropriate mood and affect.   Lymph Detail Normal Submental. Submandibular. Anterior cervical. Posterior cervical. Supraclavicular.   Eyes Normal Conjunctiva - Right: Normal, Left: Normal. Pupil - Right: Normal, Left: Normal.    Ears  Normal Inspection - Right: Normal, Left: Normal. Canal - Left: Normal. TM - Right: Normal, Left: Normal.     Patients exam showed wax impaction right ear, wax impaction left ear. Ear wax was removed under the operative microscope. No complications. Patient tolerated the procedure well. Ear exam findings listed in note after removal.    ASSESSMENT AND PLAN:   1. Bilateral impacted cerumen  Problem is resolved.  She will return in 3 months for recheck.  She was given Antivert to be used for motion segments.      The patient indicates understanding of these issues and agrees to the plan.      Reed Liz MD  6/18/2024  1:56 PM

## 2024-06-20 ENCOUNTER — HOSPITAL ENCOUNTER (OUTPATIENT)
Dept: ULTRASOUND IMAGING | Facility: HOSPITAL | Age: 69
Discharge: HOME | End: 2024-06-20
Attending: STUDENT IN AN ORGANIZED HEALTH CARE EDUCATION/TRAINING PROGRAM

## 2024-06-20 DIAGNOSIS — I83.813 VARICOSE VEINS OF BOTH LOWER EXTREMITIES WITH PAIN: ICD-10-CM

## 2024-06-20 DIAGNOSIS — M79.662 PAIN OF LEFT CALF: ICD-10-CM

## 2024-06-20 PROCEDURE — 76882 US LMTD JT/FCL EVL NVASC XTR: CPT | Performed by: STUDENT IN AN ORGANIZED HEALTH CARE EDUCATION/TRAINING PROGRAM

## 2024-07-25 ENCOUNTER — TELEPHONE (OUTPATIENT)
Dept: INTERNAL MEDICINE CLINIC | Facility: CLINIC | Age: 69
End: 2024-07-25

## 2024-07-25 NOTE — TELEPHONE ENCOUNTER
Patient walk in to drop off form for airline travel insurance reimbursement for trip missed due to covid.     [facility]  Claims Dept   [address]  [facility] [address]  Fax: [phone]  [email or web address]    Check pmt made #[phone] $25.00    Original document sent to Forms dept via interoffice mail

## 2024-07-30 NOTE — TELEPHONE ENCOUNTER
[facility] Travel Reimbursement forms in the forms department. Logged for processing. [employer] message sent for authorization.

## 2024-07-31 ENCOUNTER — LAB SERVICES (OUTPATIENT)
Dept: LAB | Age: 69
End: 2024-07-31

## 2024-07-31 DIAGNOSIS — G40.909 SEIZURE DISORDER  (CMD): ICD-10-CM

## 2024-07-31 LAB
ALBUMIN SERPL-MCNC: 4 G/DL (ref 3.6–5.1)
ALBUMIN/GLOB SERPL: 1.3 {RATIO} (ref 1–2.4)
ALP SERPL-CCNC: 167 UNITS/L (ref 45–117)
ALT SERPL-CCNC: 32 UNITS/L
ANION GAP SERPL CALC-SCNC: 15 MMOL/L (ref 7–19)
AST SERPL-CCNC: 18 UNITS/L
BILIRUB SERPL-MCNC: 0.4 MG/DL (ref 0.2–1)
BUN SERPL-MCNC: 15 MG/DL (ref 6–20)
BUN/CREAT SERPL: 23 (ref 7–25)
CALCIUM SERPL-MCNC: 9 MG/DL (ref 8.4–10.2)
CARBAMAZEPINE SERPL-MCNC: 9.9 MCG/ML (ref 4–12)
CHLORIDE SERPL-SCNC: 96 MMOL/L (ref 97–110)
CO2 SERPL-SCNC: 28 MMOL/L (ref 21–32)
CREAT SERPL-MCNC: 0.64 MG/DL (ref 0.51–0.95)
EGFRCR SERPLBLD CKD-EPI 2021: >90 ML/MIN/{1.73_M2}
FASTING DURATION TIME PATIENT: ABNORMAL H
GLOBULIN SER-MCNC: 3.2 G/DL (ref 2–4)
GLUCOSE SERPL-MCNC: 96 MG/DL (ref 70–99)
POTASSIUM SERPL-SCNC: 4.7 MMOL/L (ref 3.4–5.1)
PROT SERPL-MCNC: 7.2 G/DL (ref 6.4–8.2)
SODIUM SERPL-SCNC: 134 MMOL/L (ref 135–145)

## 2024-07-31 PROCEDURE — 80156 ASSAY CARBAMAZEPINE TOTAL: CPT | Performed by: CLINICAL MEDICAL LABORATORY

## 2024-08-01 ENCOUNTER — TELEPHONE (OUTPATIENT)
Dept: NEUROSURGERY | Age: 69
End: 2024-08-01

## 2024-08-01 DIAGNOSIS — G40.909 SEIZURE DISORDER  (CMD): Primary | ICD-10-CM

## 2024-08-06 ENCOUNTER — OFFICE VISIT (OUTPATIENT)
Facility: LOCATION | Age: 69
End: 2024-08-06
Payer: COMMERCIAL

## 2024-08-06 VITALS — TEMPERATURE: 97 F | HEART RATE: 70 BPM

## 2024-08-06 DIAGNOSIS — I83.813 VARICOSE VEINS OF BOTH LOWER EXTREMITIES WITH PAIN: Primary | ICD-10-CM

## 2024-08-06 PROCEDURE — 99213 OFFICE O/P EST LOW 20 MIN: CPT | Performed by: STUDENT IN AN ORGANIZED HEALTH CARE EDUCATION/TRAINING PROGRAM

## 2024-08-06 NOTE — PROGRESS NOTES
Patient ID: Sherly Moser is a 68 year old female.    Chief Complaint   Patient presents with    Varicose Veins     EP- 1 month f/u 6/20/24 US LEG LEFT LIMITED- states pain improved       HPI: Sherly Moser is a 68 year old female presents to clinic for follow-up.  Since last appointment, she reports pain in her left posterior knee has improved.  She has noticed that the pain that she initially had in her legs bilaterally returned with more activity.  She denies any other symptoms.    Workup:   Ultrasound left leg 6/20/2024  CONCLUSION:       Grayscale and color Doppler sonography of the left proximal calf performed.  No mass, lymphadenopathy, organized fluid collection, or other focal abnormality detected.  Normal appearance of regional subcutaneous and muscular tissues.  No superficial   vessel thrombosis.       Past Medical History  Past Medical History:    Acute bronchitis    Binge eating disorder    Diverticulosis    mild    Exostosis    H/O bone density study    DEXA scan    Hypothyroidism    Internal hemorrhoids    2+    Multiple thyroid nodules    2 nodules    Ovarian cyst    complex cystic mass Rt adnexa, simple cyst in Lt adnexa    Plantar fascial fibromatosis    Seizure disorder (HCC)    last seizure was in 1997    Uterine fibroid    uterine fibroids    Uterine myoma    prominent myomas noted in the uterus       Past Surgical History  Past Surgical History:   Procedure Laterality Date    Colonoscopy  6/21/2006    to cecum    Electrocardiogram, complete  6/21/2005    Hysterectomy  2004    total hystero.    Maribel biopsy stereo nodule 1 site right (cpt=19081) Right 1995    benign    Oophorectomy Bilateral 2004    total hystero.    Other surgical history  2010    achilles heel    Other surgical history  1/30/2006    shave bx, skin mole L forearm       Medications  Current Outpatient Medications   Medication Sig Dispense Refill    meclizine 25 MG Oral Tab Take 1 tablet (25 mg total) by mouth 3  (three) times daily as needed. 90 tablet 0    methylPREDNISolone (MEDROL) 4 MG Oral Tablet Therapy Pack Dosepack: take as directed 1 each 0    Hydrocod Yahir-Chlorphe Yahir ER 10-8 MG/5ML Oral Suspension Extended Release Take 5 mL by mouth 2 (two) times daily as needed. 473 mL 0    Hydrocod Yahir-Chlorphe Yahir ER 10-8 MG/5ML Oral Suspension Extended Release Take 5 mL by mouth 2 (two) times daily as needed. (Patient not taking: Reported on 5/22/2023)      Cholecalciferol (VITAMIN D3 GUMMIES ADULT OR) Take 3,000 Units by mouth.      LEVOXYL 137 MCG Oral Tab TAKE 1 TABLET DAILY 90 tablet 2    Flaxseed Oil (LINSEED OIL) Does not apply Oil       Multiple Vitamins-Minerals (WOMENS MULTIVITAMIN PLUS) Oral Tab Take by mouth.      Calcium Carbonate-Vitamin D 600-200 MG-UNIT Oral Tab Take  by mouth 2 (two) times daily.      carbamazepine (TEGRETOL) 200 MG Oral Tab Take 2 tablets (400 mg total) by mouth 2 (two) times daily. Taking 800 daily per pt  2       Allergies  Allergies   Allergen Reactions    Erythromycin OTHER (SEE COMMENTS)     Interaction with other medication    Tomatoes        Social History  History   Smoking Status    Never   Smokeless Tobacco    Never     History   Alcohol Use    0.0 standard drinks of alcohol/week     Comment: socially     History   Drug Use No       Family History  Family History   Problem Relation Age of Onset    Heart Disease Father     Thyroid Disorder Father         \"thyroid\"    Arthritis Mother     Diabetes Mother     Hypertension Mother     Thyroid Disorder Mother         \"thyroid\"    Heart Disease Paternal Grandfather     Heart Disease Paternal Grandmother     Heart Disease Maternal Grandfather     Heart Disease Maternal Grandmother     Other (Other) Other         common variable immunodeficiency     Thyroid Disorder Other         \"thyroid\" - siblings    Other (Other) Other         fam hx iliac artery aneruysm    Thyroid Disorder Daughter        Review of Systems  Review of Systems    Constitutional: Negative.    Respiratory: Negative.     Cardiovascular: Negative.    Gastrointestinal: Negative.        Exam  Vitals:    08/06/24 1101   Pulse: 70   Temp: 96.9 °F (36.1 °C)     Physical Exam  Constitutional:       Appearance: Normal appearance.   Cardiovascular:      Rate and Rhythm: Normal rate and regular rhythm.   Pulmonary:      Effort: Pulmonary effort is normal.   Skin:     General: Skin is warm and dry.          Neurological:      Mental Status: She is alert and oriented to person, place, and time.           Assessment/Plan  Assessment   Problem List Items Addressed This Visit          Cardiac and Vasculature    Varicose veins of both lower extremities with pain - Primary       Sherly Moser is a 68 year old female with varicose veins of bilateral lower extremities with pain    Since last clinic visit, patient reports improvement of the pain she felt after her last procedure  She has noticed that the symptoms she initially presented to our office with comes back with more activity levels  Since treatment, she reports her activity levels and the duration of doing activity has lengthen but she eventually begins to feel pain  I will have patient follow-up in October to be reevaluated  Until then, she should continue her compression socks, elevation, and exercising    If patient has any questions or concerns, she is to call my office      Brigid Travis MD  General Surgery  [facility]     CC:  Luca Granados MD

## 2024-08-07 ENCOUNTER — APPOINTMENT (OUTPATIENT)
Dept: NEUROLOGY | Age: 69
End: 2024-08-07

## 2024-08-07 DIAGNOSIS — G40.909 SEIZURE DISORDER  (CMD): Primary | ICD-10-CM

## 2024-08-07 PROCEDURE — 99213 OFFICE O/P EST LOW 20 MIN: CPT | Performed by: PSYCHIATRY & NEUROLOGY

## 2024-08-07 RX ORDER — CARBAMAZEPINE 200 MG/1
TABLET ORAL
Qty: 360 TABLET | Refills: 3 | Status: SHIPPED | OUTPATIENT
Start: 2024-08-07

## 2024-08-07 ASSESSMENT — PATIENT HEALTH QUESTIONNAIRE - PHQ9
1. LITTLE INTEREST OR PLEASURE IN DOING THINGS: NOT AT ALL
SUM OF ALL RESPONSES TO PHQ9 QUESTIONS 1 AND 2: 0
SUM OF ALL RESPONSES TO PHQ9 QUESTIONS 1 AND 2: 0
CLINICAL INTERPRETATION OF PHQ2 SCORE: NO FURTHER SCREENING NEEDED
2. FEELING DOWN, DEPRESSED OR HOPELESS: NOT AT ALL

## 2024-08-09 NOTE — TELEPHONE ENCOUNTER
Patient called to get status on forms. Advised patient of our turnaround time of  7-10 business days and we would task provider for approval. Patient expressed understanding.   Type of Leave: travel reimbursement   Reason for Leave: Covid   Start date of leave: 11/20/2023  How much time needed?:   Forms Due Date:  Was Fee and Turnaround info Given?: yes

## 2024-08-09 NOTE — TELEPHONE ENCOUNTER
Ryley,    Patient is requesting a travel reimbursement form to be filled out due to Covid on 11/20/23. Do you support request?    Thank you,  Leticia

## 2024-08-12 NOTE — TELEPHONE ENCOUNTER
Janeth,     *The ACKNOWLEDGE button has been moved to the top right ribbon*    Please sign off on form if you agree to: Travel reimbursement form.  (place your signature on the first page only)    -From your Inbasket, Highlight the patient and click Chart   -Double click the 07/25/2024 Forms Completion telephone encounter  -Scroll down to the Media section   -Click the blue Hyperlink: Travel reimbursement JOMAR Ordonez 08/12/24  -Click Acknowledge located in the top right ribbon/menu   -Drag the mouse into the blank space of the document and a + sign will appear. Left click to   electronically sign the document.     Thank you,    Denisse

## 2024-08-13 NOTE — TELEPHONE ENCOUNTER
Called patient, left message to call back if any questions. Forms completed and ready for , per Release of Information.

## 2024-08-14 ENCOUNTER — APPOINTMENT (OUTPATIENT)
Dept: NEUROLOGY | Age: 69
End: 2024-08-14

## 2024-08-27 ENCOUNTER — OFFICE VISIT (OUTPATIENT)
Dept: INTERNAL MEDICINE CLINIC | Facility: CLINIC | Age: 69
End: 2024-08-27
Payer: COMMERCIAL

## 2024-08-27 VITALS
BODY MASS INDEX: 26 KG/M2 | RESPIRATION RATE: 16 BRPM | DIASTOLIC BLOOD PRESSURE: 72 MMHG | TEMPERATURE: 98 F | WEIGHT: 170 LBS | OXYGEN SATURATION: 98 % | HEART RATE: 70 BPM | SYSTOLIC BLOOD PRESSURE: 118 MMHG

## 2024-08-27 DIAGNOSIS — R74.8 ALKALINE PHOSPHATASE ELEVATION: ICD-10-CM

## 2024-08-27 DIAGNOSIS — Z00.00 ROUTINE PHYSICAL EXAMINATION: Primary | ICD-10-CM

## 2024-08-27 DIAGNOSIS — Z00.00 LABORATORY EXAM ORDERED AS PART OF ROUTINE GENERAL MEDICAL EXAMINATION: ICD-10-CM

## 2024-08-27 DIAGNOSIS — E03.9 ACQUIRED HYPOTHYROIDISM: ICD-10-CM

## 2024-08-27 DIAGNOSIS — Z91.89 FRAMINGHAM CARDIAC RISK <10% IN NEXT 10 YEARS: ICD-10-CM

## 2024-08-27 DIAGNOSIS — G40.909 SEIZURE DISORDER (HCC): ICD-10-CM

## 2024-08-27 PROCEDURE — 3074F SYST BP LT 130 MM HG: CPT | Performed by: PHYSICIAN ASSISTANT

## 2024-08-27 PROCEDURE — 99397 PER PM REEVAL EST PAT 65+ YR: CPT | Performed by: PHYSICIAN ASSISTANT

## 2024-08-27 PROCEDURE — 3008F BODY MASS INDEX DOCD: CPT | Performed by: PHYSICIAN ASSISTANT

## 2024-08-27 PROCEDURE — 3078F DIAST BP <80 MM HG: CPT | Performed by: PHYSICIAN ASSISTANT

## 2024-08-27 NOTE — PROGRESS NOTES
Wellness Exam    CC: Patient is presenting for a wellness exam    HPI:   Concerns: pt here to talk about lab work results  Had checkup through gyne  CBC wnl   TSH at goal- follows with endo.  CMP sodium 132, and Alk phos 153- thought to be related to tegretol rx, stable x years  .   6.8% ASCVD 10-year risk on lipid panel with total cholesterol at 211.     R knee pain after walking with less supportive shoes while on a walk. Tried RICE which resolved.     Diet is general, exercise: regular, walking.    Gyne:   No recommendations at this time      Denies pelvic pain, abnormal discharge or genital lesions.    Breast Cancer Screening:    Health Maintenance   Topic Date Due    Mammogram  12/28/2024      .    Bone Health: Last DEXA: 2022.     Colon cancer screening:    Health Maintenance   Topic Date Due    Colorectal Cancer Screening  02/17/2027          Pertinent Family History:   Family History   Problem Relation Age of Onset    Arthritis Mother     Diabetes Mother     Hypertension Mother     Thyroid Disorder Mother         \"thyroid\"    Heart Disease Father     Thyroid Disorder Father         \"thyroid\"    Heart Disease Brother 69        stents    Diabetes Brother     Heart Disease Maternal Grandmother     Heart Disease Maternal Grandfather     Heart Disease Paternal Grandmother     Heart Disease Paternal Grandfather     Thyroid Disorder Daughter     Other (Other) Other         common variable immunodeficiency     Thyroid Disorder Other         \"thyroid\" - siblings    Other (Other) Other         fam hx iliac artery aneruysm      Past Medical History:    Acute bronchitis    Binge eating disorder    Diverticulosis    mild    Exostosis    H/O bone density study    DEXA scan    Hypothyroidism    Internal hemorrhoids    2+    Multiple thyroid nodules    2 nodules    Ovarian cyst    complex cystic mass Rt adnexa, simple cyst in Lt adnexa    Plantar fascial fibromatosis    Seizure disorder (HCC)    last seizure was in 1997     Uterine fibroid    uterine fibroids    Uterine myoma    prominent myomas noted in the uterus     Past Surgical History:   Procedure Laterality Date    Colonoscopy  6/21/2006    to cecum    Electrocardiogram, complete  6/21/2005    Hysterectomy  2004    total hystero.    Maribel biopsy stereo nodule 1 site right (cpt=19081) Right 1995    benign    Oophorectomy Bilateral 2004    total hystero.    Other surgical history  2010    achilles heel    Other surgical history  1/30/2006    shave bx, skin mole L forearm     Social History     Socioeconomic History    Marital status:    Tobacco Use    Smoking status: Never    Smokeless tobacco: Never   Vaping Use    Vaping status: Never Used   Substance and Sexual Activity    Alcohol use: Yes     Alcohol/week: 0.0 standard drinks of alcohol     Comment: socially    Drug use: No   Other Topics Concern    Caffeine Concern No    Exercise Yes     Comment: 4 times per week      Current Outpatient Medications on File Prior to Visit   Medication Sig Dispense Refill    meclizine 25 MG Oral Tab Take 1 tablet (25 mg total) by mouth 3 (three) times daily as needed. 90 tablet 0    methylPREDNISolone (MEDROL) 4 MG Oral Tablet Therapy Pack Dosepack: take as directed 1 each 0    Cholecalciferol (VITAMIN D3 GUMMIES ADULT OR) Take 3,000 Units by mouth.      LEVOXYL 137 MCG Oral Tab TAKE 1 TABLET DAILY 90 tablet 2    Flaxseed Oil (LINSEED OIL) Does not apply Oil       Multiple Vitamins-Minerals (WOMENS MULTIVITAMIN PLUS) Oral Tab Take by mouth.      Calcium Carbonate-Vitamin D 600-200 MG-UNIT Oral Tab Take  by mouth 2 (two) times daily.      carbamazepine (TEGRETOL) 200 MG Oral Tab Take 2 tablets (400 mg total) by mouth 2 (two) times daily. Taking 800 daily per pt  2     No current facility-administered medications on file prior to visit.     Tobacco:  She has never smoked tobacco.       Review of Systems   Constitutional: Negative for fever, chills and fatigue.   HENT: Negative for hearing  loss, congestion, sore throat and neck pain.    Eyes: Negative for pain and visual disturbance.   Respiratory: Negative for cough and shortness of breath.    Cardiovascular: Negative for chest pain and palpitations.   Gastrointestinal: Negative for nausea, vomiting, abdominal pain and diarrhea.   Genitourinary: Negative for urgency, frequency of urination, and abnormal vaginal bleeding.   Musculoskeletal: Negative for arthralgias and gait problem.   Skin: Negative for color change and rash.   Neurological: Negative for tremors, weakness and numbness.   Hematological: Negative for adenopathy. Does not bruise/bleed easily.   Psychiatric/Behavioral: Negative for confusion and agitation. The patient is not nervous/anxious.      /72   Pulse 70   Temp 98 °F (36.7 °C)   Resp 16   Wt 170 lb (77.1 kg)   SpO2 98%   BMI 25.85 kg/m²   Physical Exam   Constitutional: She is oriented to person, place, and time. She appears well-developed. No distress.    Head: Normocephalic and atraumatic.   Eyes: EOM are normal. Pupils are equal, round, and reactive to light. No scleral icterus.   ENT: TM's clear, nose normal, no oropharyngeal exudates or tonsillar hypertrophy    Neck: Normal range of motion. No thyromegaly present.   Cardiovascular: Normal rate, regular rhythm and normal heart sounds.  No murmur or friction rub heard.  Pulmonary/Chest: Effort normal and breath sounds normal bilaterally. She has no wheezes or rales.   Breasts: defer to gyn  Abdominal: Soft. Bowel sounds are normal. There is no tenderness. No HSM.  Musculoskeletal: Normal range of motion. She exhibits no edema.   Lymphadenopathy: She has no cervical, supraclavicular, or axillary adenopathy.   : defer to gyn  Neurological: She is alert and oriented to person, place, and time. DTRs are +2 and symmetric. Cranial nerves grossly intact.  Skin: Skin is warm. No rash noted. No erythema, pallor or jaundice.   Psychiatric: She has a normal mood and affect  and her behavior is normal.     Assessment and Plan:  Sherly Moser is a 68 year old female here for a wellness exam.  Age appropriate cancer screening, labs, safety, immunizations were discussed with the patient and ordered as follows:  1. Routine physical examination (Primary)  2. Laboratory exam ordered as part of routine general medical examination  3. Alkaline phosphatase elevation  Mild, stable on lab review > 7 years, per neuro is likely due to tegretol. No further workup indicated.  4. [place] cardiac risk <10% in next 10 years  No statin therapy is indicated  5. Acquired hypothyroidism  Managed by endo; at goal  6. Seizure disorder (HCC)   Stable long-term on carbamazepine, no recent seizure activity. Followed by neuro    Plans for flu shot and COVID booster this fall   Discussed use of sunscreen, wearing seatbelt, recommend regular cardiovascular and weight bearing exercise as well as a well-rounded diet.    Return in about 1 year (around 8/27/2025) for routine physical, or sooner as needed.     Patient/Caregiver Education:  Patient/Caregiver Education: There are no barriers to learning. Medical education done.  Outcome: Patient verbalizes understanding.       Educated by: ALBER

## 2024-09-18 ENCOUNTER — OFFICE VISIT (OUTPATIENT)
Facility: LOCATION | Age: 69
End: 2024-09-18
Payer: COMMERCIAL

## 2024-09-18 DIAGNOSIS — H61.23 BILATERAL IMPACTED CERUMEN: Primary | ICD-10-CM

## 2024-09-18 PROCEDURE — 99213 OFFICE O/P EST LOW 20 MIN: CPT | Performed by: OTOLARYNGOLOGY

## 2024-09-18 NOTE — PROGRESS NOTES
Sherly Moser is a 68 year old female. No chief complaint on file.    HPI:   She gets wax impactions.  They will impact her ears on airplane flights and also contribute to vertigo.    REVIEW OF SYSTEMS:   GENERAL HEALTH: feels well otherwise  GENERAL : denies fever, chills, sweats, weight loss, weight gain  SKIN: denies any unusual skin lesions or rashes  RESPIRATORY: denies shortness of breath with exertion  NEURO: denies headaches    EXAM:   There were no vitals taken for this visit.    System Findings Details   Constitutional  Overall appearance - Normal.   Psychiatric  Orientation - Oriented to time, place, person & situation. Appropriate mood and affect.   Head/Face  Facial features -- Normal. Skull - Normal.   Eyes  Pupils equal ,round ,react to light and accomidate   Ears, Nose, Throat, Neck  Some wax bilaterally moved today on the microscope otherwise ear canals are normal.  Tympanic murmurs are normal.  There is no middle ear effusions.   Neurological  Memory - Normal. Cranial nerves - Cranial nerves II through XII grossly intact.   Lymph Detail  Submental. Submandibular. Anterior cervical. Posterior cervical. Supraclavicular.       ASSESSMENT AND PLAN:   1. Bilateral impacted cerumen  Status post removal.  When she builds up wax it tends to impact airplane flight and vertigo.  She will see us back in 4 months for recheck.      The patient indicates understanding of these issues and agrees to the plan.    No follow-ups on file.    Steve Orozco MD  9/18/2024  10:09 AM

## 2024-10-07 ENCOUNTER — OFFICE VISIT (OUTPATIENT)
Facility: LOCATION | Age: 69
End: 2024-10-07
Payer: COMMERCIAL

## 2024-10-07 VITALS
SYSTOLIC BLOOD PRESSURE: 153 MMHG | OXYGEN SATURATION: 97 % | WEIGHT: 166 LBS | HEART RATE: 67 BPM | BODY MASS INDEX: 25.16 KG/M2 | TEMPERATURE: 97 F | DIASTOLIC BLOOD PRESSURE: 82 MMHG | HEIGHT: 68 IN

## 2024-10-07 DIAGNOSIS — I83.813 VARICOSE VEINS OF BOTH LOWER EXTREMITIES WITH PAIN: Primary | ICD-10-CM

## 2024-10-07 NOTE — PROGRESS NOTES
Patient ID: Sherly Moser is a 68 year old female.    Chief Complaint   Patient presents with    Follow - Up     EP - 2 month follow up for varicous veins, no symptoms       HPI: Sherly Moser is a 68 year old female presents to clinic for follow-up.  Since last clinic appointment, patient denies any recurrent pain in her legs.  She has started to ease up on the walking but can still maintain her 3 miles a day.  She denies any pain with walking.  She has a known Baker's cyst in her left knee which she has identified as the cause of some of her pain.  She has been wearing compression socks she states that has helped control her pain.  She denies any other symptoms.    Workup: None      Past Medical History  Past Medical History:    Acute bronchitis    Binge eating disorder    Diverticulosis    mild    Exostosis    H/O bone density study    DEXA scan    Hypothyroidism    Internal hemorrhoids    2+    Multiple thyroid nodules    2 nodules    Ovarian cyst    complex cystic mass Rt adnexa, simple cyst in Lt adnexa    Plantar fascial fibromatosis    Seizure disorder (HCC)    last seizure was in 1997    Uterine fibroid    uterine fibroids    Uterine myoma    prominent myomas noted in the uterus       Past Surgical History  Past Surgical History:   Procedure Laterality Date    Colonoscopy  6/21/2006    to cecum    Electrocardiogram, complete  6/21/2005    Hysterectomy  2004    total hystero.    Maribel biopsy stereo nodule 1 site right (cpt=19081) Right 1995    benign    Oophorectomy Bilateral 2004    total hystero.    Other surgical history  2010    achilles heel    Other surgical history  1/30/2006    shave bx, skin mole L forearm       Medications  Current Outpatient Medications   Medication Sig Dispense Refill    meclizine 25 MG Oral Tab Take 1 tablet (25 mg total) by mouth 3 (three) times daily as needed. 90 tablet 0    methylPREDNISolone (MEDROL) 4 MG Oral Tablet Therapy Pack Dosepack: take as directed 1  each 0    Cholecalciferol (VITAMIN D3 GUMMIES ADULT OR) Take 3,000 Units by mouth.      LEVOXYL 137 MCG Oral Tab TAKE 1 TABLET DAILY 90 tablet 2    Flaxseed Oil (LINSEED OIL) Does not apply Oil       Multiple Vitamins-Minerals (WOMENS MULTIVITAMIN PLUS) Oral Tab Take by mouth.      Calcium Carbonate-Vitamin D 600-200 MG-UNIT Oral Tab Take  by mouth 2 (two) times daily.      carbamazepine (TEGRETOL) 200 MG Oral Tab Take 2 tablets (400 mg total) by mouth 2 (two) times daily. Taking 800 daily per pt  2       Allergies  Allergies   Allergen Reactions    Erythromycin OTHER (SEE COMMENTS)     Interaction with other medication    Tomatoes     Tomato RASH       Social History  History   Smoking Status    Never   Smokeless Tobacco    Never     History   Alcohol Use    0.0 standard drinks of alcohol/week     Comment: socially     History   Drug Use No       Family History  Family History   Problem Relation Age of Onset    Arthritis Mother     Diabetes Mother     Hypertension Mother     Thyroid Disorder Mother         \"thyroid\"    Heart Disease Father     Thyroid Disorder Father         \"thyroid\"    Heart Disease Brother 69        stents    Diabetes Brother     Heart Disease Maternal Grandmother     Heart Disease Maternal Grandfather     Heart Disease Paternal Grandmother     Heart Disease Paternal Grandfather     Thyroid Disorder Daughter     Other (Other) Other         common variable immunodeficiency     Thyroid Disorder Other         \"thyroid\" - siblings    Other (Other) Other         fam hx iliac artery aneruysm       Review of Systems  Review of Systems   Constitutional: Negative.    Respiratory: Negative.     Cardiovascular: Negative.    Gastrointestinal: Negative.        Exam  Vitals:    10/07/24 1017   BP: 153/82   Pulse: 67   Temp: 97.3 °F (36.3 °C)     Physical Exam  Constitutional:       Appearance: Normal appearance.   Cardiovascular:      Rate and Rhythm: Normal rate.   Pulmonary:      Effort: Pulmonary effort is  normal.   Skin:     General: Skin is warm and dry.   Neurological:      Mental Status: She is alert and oriented to person, place, and time.                 Assessment/Plan  Assessment   Problem List Items Addressed This Visit          Cardiac and Vasculature    Varicose veins of both lower extremities with pain - Primary       Sherly Moser is a 68 year old female with varicose veins of both legs with pain    On physical exam, patient has persistent small spider veins and some reticular veins behind her knees  Patient symptoms have completely resolved  She feels that she can control them with compression socks and stockings  I recommend continued observation  Patient can begin to wear the compression socks and stockings as needed  If she has any recurrent pain, she can follow-up with my office     Thank you for letting me participate in the care of this patient      Brigid Travis MD  General Surgery  [facility]     CC:  Luca Granados MD

## 2024-11-01 ENCOUNTER — LAB SERVICES (OUTPATIENT)
Dept: LAB | Age: 69
End: 2024-11-01

## 2024-11-01 DIAGNOSIS — G40.909 SEIZURE DISORDER  (CMD): ICD-10-CM

## 2024-11-01 DIAGNOSIS — E55.9 VITAMIN D DEFICIENCY: Primary | ICD-10-CM

## 2024-11-01 LAB
25(OH)D3+25(OH)D2 SERPL-MCNC: 58.8 NG/ML (ref 30–100)
ALBUMIN SERPL-MCNC: 3.6 G/DL (ref 3.4–5)
ALBUMIN/GLOB SERPL: 1.1 {RATIO} (ref 1–2.4)
ALP SERPL-CCNC: 160 UNITS/L (ref 45–117)
ALT SERPL-CCNC: 28 UNITS/L
ANION GAP SERPL CALC-SCNC: 12 MMOL/L (ref 7–19)
AST SERPL-CCNC: 18 UNITS/L
BASOPHILS # BLD: 0.1 K/MCL (ref 0–0.3)
BASOPHILS NFR BLD: 2 %
BILIRUB SERPL-MCNC: 0.3 MG/DL (ref 0.2–1)
BUN SERPL-MCNC: 11 MG/DL (ref 6–20)
BUN/CREAT SERPL: 20 (ref 7–25)
CALCIUM SERPL-MCNC: 8.8 MG/DL (ref 8.4–10.2)
CARBAMAZEPINE SERPL-MCNC: 11.7 MCG/ML (ref 4–12)
CHLORIDE SERPL-SCNC: 98 MMOL/L (ref 97–110)
CO2 SERPL-SCNC: 32 MMOL/L (ref 21–32)
CREAT SERPL-MCNC: 0.54 MG/DL (ref 0.51–0.95)
DEPRECATED RDW RBC: 41.5 FL (ref 39–50)
EGFRCR SERPLBLD CKD-EPI 2021: >90 ML/MIN/{1.73_M2}
EOSINOPHIL # BLD: 0.1 K/MCL (ref 0–0.5)
EOSINOPHIL NFR BLD: 3 %
ERYTHROCYTE [DISTWIDTH] IN BLOOD: 11.9 % (ref 11–15)
FASTING DURATION TIME PATIENT: ABNORMAL H
GLOBULIN SER-MCNC: 3.3 G/DL (ref 2–4)
GLUCOSE SERPL-MCNC: 91 MG/DL (ref 70–99)
HCT VFR BLD CALC: 41.4 % (ref 36–46.5)
HGB BLD-MCNC: 13.8 G/DL (ref 12–15.5)
IMM GRANULOCYTES # BLD AUTO: 0 K/MCL (ref 0–0.2)
IMM GRANULOCYTES # BLD: 0 %
LYMPHOCYTES # BLD: 1.9 K/MCL (ref 1–4)
LYMPHOCYTES NFR BLD: 43 %
MCH RBC QN AUTO: 31.5 PG (ref 26–34)
MCHC RBC AUTO-ENTMCNC: 33.3 G/DL (ref 32–36.5)
MCV RBC AUTO: 94.5 FL (ref 78–100)
MONOCYTES # BLD: 0.5 K/MCL (ref 0.3–0.9)
MONOCYTES NFR BLD: 12 %
NEUTROPHILS # BLD: 1.7 K/MCL (ref 1.8–7.7)
NEUTROPHILS NFR BLD: 40 %
NRBC BLD MANUAL-RTO: 0 /100 WBC
PLATELET # BLD AUTO: 226 K/MCL (ref 140–450)
POTASSIUM SERPL-SCNC: 4.7 MMOL/L (ref 3.4–5.1)
PROT SERPL-MCNC: 6.9 G/DL (ref 6.4–8.2)
RBC # BLD: 4.38 MIL/MCL (ref 4–5.2)
SODIUM SERPL-SCNC: 137 MMOL/L (ref 135–145)
WBC # BLD: 4.3 K/MCL (ref 4.2–11)

## 2024-11-01 PROCEDURE — 80053 COMPREHEN METABOLIC PANEL: CPT | Performed by: INTERNAL MEDICINE

## 2024-11-01 PROCEDURE — 36415 COLL VENOUS BLD VENIPUNCTURE: CPT | Performed by: PSYCHIATRY & NEUROLOGY

## 2024-11-01 PROCEDURE — 82306 VITAMIN D 25 HYDROXY: CPT | Performed by: INTERNAL MEDICINE

## 2024-11-01 PROCEDURE — 85025 COMPLETE CBC W/AUTO DIFF WBC: CPT | Performed by: INTERNAL MEDICINE

## 2024-11-01 PROCEDURE — 80156 ASSAY CARBAMAZEPINE TOTAL: CPT | Performed by: CLINICAL MEDICAL LABORATORY

## 2024-12-30 ENCOUNTER — HOSPITAL ENCOUNTER (OUTPATIENT)
Dept: MAMMOGRAPHY | Age: 69
Discharge: HOME | End: 2024-12-30
Attending: OBSTETRICS & GYNECOLOGY
Payer: COMMERCIAL

## 2024-12-30 DIAGNOSIS — Z12.31 ENCOUNTER FOR SCREENING MAMMOGRAM FOR MALIGNANT NEOPLASM OF BREAST: ICD-10-CM

## 2024-12-30 DIAGNOSIS — Z01.419 ENCOUNTER FOR GYNECOLOGICAL EXAMINATION WITHOUT ABNORMAL FINDING: ICD-10-CM

## 2024-12-30 DIAGNOSIS — R92.30 DENSE BREASTS: ICD-10-CM

## 2024-12-30 PROCEDURE — 77063 BREAST TOMOSYNTHESIS BI: CPT | Performed by: OBSTETRICS & GYNECOLOGY

## 2024-12-30 PROCEDURE — 77067 SCR MAMMO BI INCL CAD: CPT | Performed by: OBSTETRICS & GYNECOLOGY

## 2025-01-21 ENCOUNTER — OFFICE VISIT (OUTPATIENT)
Facility: LOCATION | Age: 70
End: 2025-01-21
Payer: COMMERCIAL

## 2025-01-21 DIAGNOSIS — H61.23 BILATERAL IMPACTED CERUMEN: Primary | ICD-10-CM

## 2025-01-21 PROCEDURE — 99212 OFFICE O/P EST SF 10 MIN: CPT | Performed by: OTOLARYNGOLOGY

## 2025-01-21 NOTE — PROGRESS NOTES
Sherly Moser is a 69 year old female. No chief complaint on file.    HPI:    History of wax impactions.     REVIEW OF SYSTEMS:   GENERAL HEALTH: feels well otherwise  GENERAL : denies fever, chills, sweats, weight loss, weight gain  SKIN: denies any unusual skin lesions or rashes  RESPIRATORY: denies shortness of breath with exertion  NEURO: denies headaches    EXAM:   There were no vitals taken for this visit.    System Findings Details   Constitutional  Overall appearance - Normal.   Psychiatric  Orientation - Oriented to time, place, person & situation. Appropriate mood and affect.   Head/Face  Facial features -- Normal. Skull - Normal.   Eyes  Pupils equal ,round ,react to light and accomidate   Ears, Nose, Throat, Neck  Wax impactions removed under microscope. No infection    Neurological  Memory - Normal. Cranial nerves - Cranial nerves II through XII grossly intact.   Lymph Detail  Submental. Submandibular. Anterior cervical. Posterior cervical. Supraclavicular.       ASSESSMENT AND PLAN:   1. Bilateral impacted cerumen  Removed today. F/u 4 months      The patient indicates understanding of these issues and agrees to the plan.    No follow-ups on file.    Steve rOozco MD  1/21/2025  1:37 PM

## 2025-01-24 ENCOUNTER — TELEPHONE (OUTPATIENT)
Dept: NEUROLOGY | Age: 70
End: 2025-01-24

## 2025-02-05 ENCOUNTER — APPOINTMENT (OUTPATIENT)
Dept: NEUROLOGY | Age: 70
End: 2025-02-05

## 2025-02-07 ENCOUNTER — APPOINTMENT (OUTPATIENT)
Dept: NEUROLOGY | Age: 70
End: 2025-02-07

## 2025-02-07 VITALS
DIASTOLIC BLOOD PRESSURE: 82 MMHG | BODY MASS INDEX: 25.01 KG/M2 | HEART RATE: 83 BPM | WEIGHT: 165 LBS | TEMPERATURE: 97.3 F | OXYGEN SATURATION: 98 % | SYSTOLIC BLOOD PRESSURE: 134 MMHG | RESPIRATION RATE: 20 BRPM | HEIGHT: 68 IN

## 2025-02-07 DIAGNOSIS — G40.909 SEIZURE DISORDER  (CMD): Primary | ICD-10-CM

## 2025-02-07 PROCEDURE — 99215 OFFICE O/P EST HI 40 MIN: CPT

## 2025-02-07 ASSESSMENT — ENCOUNTER SYMPTOMS
LOSS OF CONSCIOUSNESS: 0
DOUBLE VISION: 0
FEVER: 0
SEIZURES: 0
HEADACHES: 0
BRUISES/BLEEDS EASILY: 0
BACK PAIN: 0
NAUSEA: 0
NERVOUS/ANXIOUS: 0
SHORTNESS OF BREATH: 0
PHOTOPHOBIA: 0
DIZZINESS: 0
CONSTIPATION: 0
WEIGHT LOSS: 0
VOMITING: 0
CHILLS: 0
SORE THROAT: 0
BLURRED VISION: 0
WEAKNESS: 0
DEPRESSION: 0
DIARRHEA: 0
WHEEZING: 0
COUGH: 0

## 2025-02-07 ASSESSMENT — PATIENT HEALTH QUESTIONNAIRE - PHQ9
1. LITTLE INTEREST OR PLEASURE IN DOING THINGS: NOT AT ALL
SUM OF ALL RESPONSES TO PHQ9 QUESTIONS 1 AND 2: 0
CLINICAL INTERPRETATION OF PHQ2 SCORE: NO FURTHER SCREENING NEEDED
2. FEELING DOWN, DEPRESSED OR HOPELESS: NOT AT ALL
SUM OF ALL RESPONSES TO PHQ9 QUESTIONS 1 AND 2: 0

## 2025-02-07 ASSESSMENT — PAIN SCALES - GENERAL: PAINLEVEL: 0

## 2025-02-10 ENCOUNTER — TELEPHONE (OUTPATIENT)
Dept: NEUROLOGY | Age: 70
End: 2025-02-10

## 2025-03-17 ENCOUNTER — HOSPITAL ENCOUNTER (OUTPATIENT)
Dept: MAMMOGRAPHY | Facility: HOSPITAL | Age: 70
Discharge: HOME | End: 2025-03-17
Attending: OBSTETRICS & GYNECOLOGY
Payer: COMMERCIAL

## 2025-03-17 DIAGNOSIS — R92.30 DENSE BREAST: ICD-10-CM

## 2025-03-17 PROCEDURE — 76641 ULTRASOUND BREAST COMPLETE: CPT | Performed by: OBSTETRICS & GYNECOLOGY

## 2025-03-29 ENCOUNTER — OFFICE VISIT (OUTPATIENT)
Dept: FAMILY MEDICINE CLINIC | Facility: CLINIC | Age: 70
End: 2025-03-29
Payer: COMMERCIAL

## 2025-03-29 VITALS
OXYGEN SATURATION: 96 % | BODY MASS INDEX: 25.01 KG/M2 | HEIGHT: 68 IN | SYSTOLIC BLOOD PRESSURE: 154 MMHG | RESPIRATION RATE: 18 BRPM | DIASTOLIC BLOOD PRESSURE: 90 MMHG | HEART RATE: 75 BPM | TEMPERATURE: 97 F | WEIGHT: 165 LBS

## 2025-03-29 DIAGNOSIS — J02.9 SORE THROAT: Primary | ICD-10-CM

## 2025-03-29 DIAGNOSIS — B34.9 VIRAL ILLNESS: ICD-10-CM

## 2025-03-29 LAB
CONTROL LINE PRESENT WITH A CLEAR BACKGROUND (YES/NO): YES YES/NO
KIT LOT #: NORMAL NUMERIC

## 2025-03-29 PROCEDURE — 3080F DIAST BP >= 90 MM HG: CPT | Performed by: NURSE PRACTITIONER

## 2025-03-29 PROCEDURE — 3008F BODY MASS INDEX DOCD: CPT | Performed by: NURSE PRACTITIONER

## 2025-03-29 PROCEDURE — 3077F SYST BP >= 140 MM HG: CPT | Performed by: NURSE PRACTITIONER

## 2025-03-29 PROCEDURE — 87880 STREP A ASSAY W/OPTIC: CPT | Performed by: NURSE PRACTITIONER

## 2025-03-29 PROCEDURE — 99213 OFFICE O/P EST LOW 20 MIN: CPT | Performed by: NURSE PRACTITIONER

## 2025-03-29 NOTE — PROGRESS NOTES
Subjective:   Patient ID: Sherly Moser is a 69 year old female.    Patient is a 69 year old female who presents today with complaints of sore throat, right ear pain, pnd, runny nose, nasal congestion and slight cough x last night. Denies fever, body aches, chills, SOB, wheezing, n/v/d, abdominal pain, headaches or rashes. Tolerating PO well at home. Attempted treatment prior to arrival = Motrin (LD last night). No ill contacts she can identify.        History/Other:   Review of Systems   Constitutional:  Negative for appetite change, chills and fever.   HENT:  Positive for congestion, ear pain, postnasal drip, rhinorrhea and sore throat.    Respiratory:  Positive for cough. Negative for shortness of breath and wheezing.    Gastrointestinal:  Negative for abdominal pain, diarrhea, nausea and vomiting.   Musculoskeletal:  Negative for myalgias.   Skin:  Negative for rash.   Neurological:  Negative for headaches.     Current Outpatient Medications   Medication Sig Dispense Refill    meclizine 25 MG Oral Tab Take 1 tablet (25 mg total) by mouth 3 (three) times daily as needed. 90 tablet 0    methylPREDNISolone (MEDROL) 4 MG Oral Tablet Therapy Pack Dosepack: take as directed 1 each 0    Cholecalciferol (VITAMIN D3 GUMMIES ADULT OR) Take 3,000 Units by mouth.      LEVOXYL 137 MCG Oral Tab TAKE 1 TABLET DAILY 90 tablet 2    Flaxseed Oil (LINSEED OIL) Does not apply Oil       Multiple Vitamins-Minerals (WOMENS MULTIVITAMIN PLUS) Oral Tab Take by mouth.      Calcium Carbonate-Vitamin D 600-200 MG-UNIT Oral Tab Take  by mouth 2 (two) times daily.      carbamazepine (TEGRETOL) 200 MG Oral Tab Take 2 tablets (400 mg total) by mouth 2 (two) times daily. Taking 800 daily per pt  2     Allergies:Allergies[1]    /90 (BP Location: Left arm)   Pulse 75   Temp 97 °F (36.1 °C) (Temporal)   Resp 18   Ht 5' 8\" (1.727 m)   Wt 165 lb (74.8 kg)   SpO2 96%   BMI 25.09 kg/m²       Objective:   Physical Exam  Vitals  reviewed.   Constitutional:       General: She is awake. She is not in acute distress.     Appearance: Normal appearance. She is well-developed and well-groomed. She is not ill-appearing, toxic-appearing or diaphoretic.   HENT:      Head: Normocephalic and atraumatic.      Right Ear: Tympanic membrane, ear canal and external ear normal.      Left Ear: Tympanic membrane, ear canal and external ear normal.      Nose: Nose normal.      Mouth/Throat:      Lips: Pink.      Mouth: Mucous membranes are moist. No oral lesions.      Pharynx: Oropharynx is clear. Uvula midline. Posterior oropharyngeal erythema present.   Cardiovascular:      Rate and Rhythm: Normal rate and regular rhythm.      Heart sounds: Normal heart sounds.   Pulmonary:      Effort: Pulmonary effort is normal. No respiratory distress.      Breath sounds: Normal breath sounds and air entry. No decreased breath sounds, wheezing, rhonchi or rales.   Lymphadenopathy:      Head:      Right side of head: No tonsillar adenopathy.      Left side of head: No tonsillar adenopathy.      Cervical: No cervical adenopathy.   Skin:     General: Skin is warm and dry.   Neurological:      Mental Status: She is alert and oriented to person, place, and time.   Psychiatric:         Behavior: Behavior is cooperative.         Assessment & Plan:   1. Sore throat    2. Viral illness        Orders Placed This Encounter   Procedures    Strep A Assay W/Optic     Results for orders placed or performed in visit on 03/29/25   Strep A Assay W/Optic    Collection Time: 03/29/25  8:52 AM   Result Value Ref Range    Strep Grp A Screen neg Negative    Control Line Present with a clear background (yes/no) yes Yes/No    Kit Lot # [id number] Numeric    Kit Expiration Date 11/13/25 Date         Meds This Visit:  Requested Prescriptions      No prescriptions requested or ordered in this encounter     Reviewed POC test results with patient.  Reassuring physical exam findings. Elevated BP x 2, + hx  white coat syndrome. Other vitals WNL. No sign of bacterial etiology, RDS or dehydration at this time.  Supportive care and return to care measures reviewed.  Patient v/u and is comfortable with this plan.    Patient Instructions   Tylenol and Motrin as needed  Rest, push fluids  Mucinex DM over the counter for nasal congestion/cough  START daily antihistamine (Zyrtec, Claritin, Allegra) and choose one of those. Take daily for 1-2 weeks to help with any post nasal drainage/sore throat  START Flonase nasal spray daily. 1 spray in each nostril  Return to care for new/worsening symptoms or if symptoms persist for 2+ weeks without any improvement            [1]   Allergies  Allergen Reactions    Erythromycin OTHER (SEE COMMENTS)     Interaction with other medication    Tomatoes     Tomato RASH

## 2025-05-16 ENCOUNTER — OFFICE VISIT (OUTPATIENT)
Facility: LOCATION | Age: 70
End: 2025-05-16
Payer: COMMERCIAL

## 2025-05-16 DIAGNOSIS — H61.23 BILATERAL IMPACTED CERUMEN: Primary | ICD-10-CM

## 2025-05-16 DIAGNOSIS — H93.13 TINNITUS OF BOTH EARS: ICD-10-CM

## 2025-05-16 PROCEDURE — 99213 OFFICE O/P EST LOW 20 MIN: CPT | Performed by: OTOLARYNGOLOGY

## 2025-05-16 NOTE — PROGRESS NOTES
Sherly Moser is a 69 year old female.   Chief Complaint   Patient presents with    Cerumen Impaction     HPI:   Last night she noticed a pulsatile tinnitus in her right ear.    REVIEW OF SYSTEMS:   GENERAL HEALTH: feels well otherwise  GENERAL : denies fever, chills, sweats, weight loss, weight gain  SKIN: denies any unusual skin lesions or rashes  RESPIRATORY: denies shortness of breath with exertion  NEURO: denies headaches    EXAM:   There were no vitals taken for this visit.    System Findings Details   Constitutional  Overall appearance - Normal.   Psychiatric  Orientation - Oriented to time, place, person & situation. Appropriate mood and affect.   Head/Face  Facial features -- Normal. Skull - Normal.   Eyes  Pupils equal ,round ,react to light and accomidate   Ears, Nose, Throat, Neck  Some wax occluding the right ear removed today on the microscope.  Otherwise tympanic membrane's are clear   Neurological  Memory - Normal. Cranial nerves - Cranial nerves II through XII grossly intact.   Lymph Detail  Submental. Submandibular. Anterior cervical. Posterior cervical. Supraclavicular.       ASSESSMENT AND PLAN:   1. Bilateral impacted cerumen  Removed today    2. Tinnitus of both ears  She has had a pulsatile type tinnitus in the right ear.  This is more likely a venous hum.  The wax occlusion could have been playing a role.  As long as it is intermittent we will be conservative.  She will see me back in 3 months for recheck.      The patient indicates understanding of these issues and agrees to the plan.    No follow-ups on file.    Steve Orozco MD  5/16/2025  11:22 AM

## 2025-07-03 ENCOUNTER — V-VISIT (OUTPATIENT)
Dept: NEUROLOGY | Age: 70
End: 2025-07-03

## 2025-07-03 DIAGNOSIS — G40.909 SEIZURE DISORDER  (CMD): Primary | ICD-10-CM

## 2025-07-11 DIAGNOSIS — G40.909 SEIZURE DISORDER  (CMD): ICD-10-CM

## 2025-07-14 RX ORDER — CARBAMAZEPINE 200 MG/1
400 TABLET ORAL 2 TIMES DAILY
Qty: 360 TABLET | Refills: 3 | Status: SHIPPED | OUTPATIENT
Start: 2025-07-14

## 2025-07-30 ENCOUNTER — APPOINTMENT (OUTPATIENT)
Dept: NEUROLOGY | Age: 70
End: 2025-07-30

## 2025-08-04 ENCOUNTER — APPOINTMENT (OUTPATIENT)
Dept: NEUROLOGY | Age: 70
End: 2025-08-04

## 2025-08-06 ENCOUNTER — APPOINTMENT (OUTPATIENT)
Dept: NEUROLOGY | Age: 70
End: 2025-08-06

## 2025-08-19 ENCOUNTER — OFFICE VISIT (OUTPATIENT)
Facility: LOCATION | Age: 70
End: 2025-08-19

## 2025-08-19 DIAGNOSIS — H61.23 BILATERAL IMPACTED CERUMEN: Primary | ICD-10-CM

## 2025-08-19 PROCEDURE — 99213 OFFICE O/P EST LOW 20 MIN: CPT | Performed by: OTOLARYNGOLOGY

## 2026-01-08 ENCOUNTER — APPOINTMENT (OUTPATIENT)
Dept: NEUROLOGY | Age: 71
End: 2026-01-08

## (undated) NOTE — ED AVS SNAPSHOT
Charlie Lua   MRN: [id number]    Department:  [facility] Emergency Department   Date of Visit:  8/20/2017           Disclosure     Insurance plans vary and the physician(s) referred by the ER may not be covered by your plan.  Please contac If you have been prescribed any medication(s), please fill your prescription right away and begin taking the medication(s) as directed    If the emergency physician has read X-rays, these will be re-interpreted by a radiologist.  If there is a significant

## (undated) NOTE — MR AVS SNAPSHOT
[address]  [address] [id number] [phone]               Thank you for choosing us for your health care visit with Buddy Almonte PA-C.   We are glad to serve you and happy to provide you with Hydrocod Polst-CPM Polst ER 10-8 MG/5ML Lqcr   2 tsp every 12 hours PRN   Commonly known as:  TUSSIONEX           HYDROcodone Bitartrate Aruna   by Does not apply route. levETIRAcetam 500 MG Tabs   Take 500 mg by mouth 2 (two) times daily.    Comm

## (undated) NOTE — MR AVS SNAPSHOT
[address]  [address] [id number] [phone]               Thank you for choosing us for your health care visit with Raleigh Ibrahim MD.  We are glad to serve you and happy to provide you with this rice To schedule an appointment for your radiology test please call [facility] Scheduling   at [phone].          Reason for Today's Visit     Cough           Medical Issues Discussed Today     Chronic cough    -  Primary    Atypical pneumonia Certain prescribed medicines can cause a chronic cough in some people:  · ACE inhibitors for high blood pressure. These include benazepril, captopril, enalapril, fosinopril, lisinopril, quinapril, ramipril, and others.   · Beta-blockers for high blood press · Night sweats (sheets and pajamas get soaking wet)  Call 911, or get immediate medical care  Contact emergency services right away if any of these occur:  · Coughing up blood  · Moderate to severe trouble breathing or wheezing  Date Last Reviewed: 9/13/20 [address] [facility], [address] [place] [address], [address] [place] [address]     Phone:  [phone]    - Amoxicillin-Pot Clavulanate 875-125 MG Tabs  - predniSONE 10 MG Tabs            MyChart     Visit MyChart  You can access your MyChart to

## (undated) NOTE — Clinical Note
I had the pleasure of seeing Jacqui Hart on 9/11/2023. Please see my attached note.   Rohit Sanchez MD FACS EM[facility]--Surgery

## (undated) NOTE — MR AVS SNAPSHOT
[address]  [address] [facility] [id number] [phone]               Thank you for choosing us for your health care visit with Slime Reyes MD.  We are glad to serve you and happy to provide you with this s your appointment immediately. However, if you are unsure about the requirements for authorization, please wait 5-7 days and then contact your physician's office.  At that time, you will be provided with any authorization numbers or be assured that none are and this prescription was added. Make sure you understand how and when to take each. Commonly known as:  TUSSIONEX PENNKINETIC ER           HYDROcodone Bitartrate Aruna   by Does not apply route.            levETIRAcetam 500 MG Tabs   Take 500 mg by mouth [email or web address]

## (undated) NOTE — Clinical Note
I had the pleasure of seeing Sherly Moser on 1/17/2024.  Please see my attached note.  Opal Ring MD FACS [facility]--Surgery

## (undated) NOTE — MR AVS SNAPSHOT
[address]  [address] [address] [phone]               Thank you for choosing us for your health care visit with Stephan Gilmore MD.  We are glad to serve you and happy to provide you with this rice airways to spasm. An episode of bronchospasm may last 7 to 14 days. Medicine may be prescribed to relax the airways and prevent wheezing. Antibiotics will be prescribed only if your healthcare provider thinks there is a bacterial infection.  Antibiotics do Date Last Reviewed: 9/13/2015  © 7592-6886 [address], [address] [place], [place] [address]. All rights reserved. This information is not intended as a substitute for professional medical care.  Always follow your healthcare professional Visit [facility] online at  [email or web address]

## (undated) NOTE — MR AVS SNAPSHOT
[address] [facility] [address]  [address] [facility], [address] [address]  [phone]               Thank you for choosing us for your health care visit with Marlinda Curling, MD.  We are glad to serve you and happy to provide you with this s Take 400 mg by mouth 2 (two) times daily. Commonly known as:  TEGRETOL           cetirizine 10 MG Tabs   Take 1 tablet (10 mg total) by mouth daily.    Commonly known as:  ZYRTEC           Fluticasone Propionate 50 MCG/ACT Susp   Commonly known as:  FLONA